# Patient Record
Sex: MALE | Race: OTHER | HISPANIC OR LATINO
[De-identification: names, ages, dates, MRNs, and addresses within clinical notes are randomized per-mention and may not be internally consistent; named-entity substitution may affect disease eponyms.]

---

## 2018-10-16 PROBLEM — Z00.00 ENCOUNTER FOR PREVENTIVE HEALTH EXAMINATION: Status: ACTIVE | Noted: 2018-10-16

## 2018-12-17 ENCOUNTER — APPOINTMENT (OUTPATIENT)
Dept: OTOLARYNGOLOGY | Facility: CLINIC | Age: 58
End: 2018-12-17
Payer: COMMERCIAL

## 2018-12-17 VITALS
BODY MASS INDEX: 31.92 KG/M2 | WEIGHT: 201 LBS | HEIGHT: 66.5 IN | HEART RATE: 109 BPM | DIASTOLIC BLOOD PRESSURE: 71 MMHG | SYSTOLIC BLOOD PRESSURE: 122 MMHG

## 2018-12-17 DIAGNOSIS — Z78.9 OTHER SPECIFIED HEALTH STATUS: ICD-10-CM

## 2018-12-17 DIAGNOSIS — H91.93 UNSPECIFIED HEARING LOSS, BILATERAL: ICD-10-CM

## 2018-12-17 DIAGNOSIS — Z86.79 PERSONAL HISTORY OF OTHER DISEASES OF THE CIRCULATORY SYSTEM: ICD-10-CM

## 2018-12-17 PROCEDURE — 92557 COMPREHENSIVE HEARING TEST: CPT

## 2018-12-17 PROCEDURE — 99243 OFF/OP CNSLTJ NEW/EST LOW 30: CPT | Mod: 25

## 2018-12-17 PROCEDURE — 92567 TYMPANOMETRY: CPT

## 2019-01-03 ENCOUNTER — OUTPATIENT (OUTPATIENT)
Dept: OUTPATIENT SERVICES | Facility: HOSPITAL | Age: 59
LOS: 1 days | Discharge: HOME | End: 2019-01-03

## 2019-01-03 ENCOUNTER — APPOINTMENT (OUTPATIENT)
Dept: SPEECH THERAPY | Facility: CLINIC | Age: 59
End: 2019-01-03

## 2019-01-07 DIAGNOSIS — H90.3 SENSORINEURAL HEARING LOSS, BILATERAL: ICD-10-CM

## 2019-01-23 ENCOUNTER — APPOINTMENT (OUTPATIENT)
Dept: PHARMACY | Facility: CLINIC | Age: 59
End: 2019-01-23
Payer: SELF-PAY

## 2019-01-23 PROCEDURE — V5010 ASSESSMENT FOR HEARING AID: CPT | Mod: NC

## 2019-01-27 ENCOUNTER — FORM ENCOUNTER (OUTPATIENT)
Age: 59
End: 2019-01-27

## 2019-01-28 ENCOUNTER — APPOINTMENT (OUTPATIENT)
Dept: MRI IMAGING | Facility: CLINIC | Age: 59
End: 2019-01-28
Payer: COMMERCIAL

## 2019-01-28 ENCOUNTER — APPOINTMENT (OUTPATIENT)
Dept: CT IMAGING | Facility: CLINIC | Age: 59
End: 2019-01-28
Payer: COMMERCIAL

## 2019-01-28 ENCOUNTER — OUTPATIENT (OUTPATIENT)
Dept: OUTPATIENT SERVICES | Facility: HOSPITAL | Age: 59
LOS: 1 days | End: 2019-01-28
Payer: COMMERCIAL

## 2019-01-28 DIAGNOSIS — Z00.8 ENCOUNTER FOR OTHER GENERAL EXAMINATION: ICD-10-CM

## 2019-01-28 PROCEDURE — 70480 CT ORBIT/EAR/FOSSA W/O DYE: CPT | Mod: 26

## 2019-01-28 PROCEDURE — 70480 CT ORBIT/EAR/FOSSA W/O DYE: CPT

## 2019-02-11 ENCOUNTER — APPOINTMENT (OUTPATIENT)
Dept: OTOLARYNGOLOGY | Facility: CLINIC | Age: 59
End: 2019-02-11

## 2019-04-09 ENCOUNTER — APPOINTMENT (OUTPATIENT)
Dept: PHARMACY | Facility: CLINIC | Age: 59
End: 2019-04-09
Payer: SELF-PAY

## 2019-04-09 PROCEDURE — V5010 ASSESSMENT FOR HEARING AID: CPT | Mod: NC

## 2019-05-06 ENCOUNTER — APPOINTMENT (OUTPATIENT)
Dept: PHARMACY | Facility: CLINIC | Age: 59
End: 2019-05-06

## 2019-06-06 ENCOUNTER — APPOINTMENT (OUTPATIENT)
Dept: SPEECH THERAPY | Facility: CLINIC | Age: 59
End: 2019-06-06

## 2019-06-06 ENCOUNTER — APPOINTMENT (OUTPATIENT)
Dept: PHARMACY | Facility: CLINIC | Age: 59
End: 2019-06-06
Payer: SELF-PAY

## 2019-06-06 PROCEDURE — V5299A: CUSTOM | Mod: NC

## 2019-06-24 ENCOUNTER — APPOINTMENT (OUTPATIENT)
Dept: OTOLARYNGOLOGY | Facility: CLINIC | Age: 59
End: 2019-06-24
Payer: COMMERCIAL

## 2019-06-24 VITALS
BODY MASS INDEX: 31.76 KG/M2 | HEIGHT: 66.5 IN | HEART RATE: 97 BPM | WEIGHT: 200 LBS | DIASTOLIC BLOOD PRESSURE: 86 MMHG | SYSTOLIC BLOOD PRESSURE: 127 MMHG

## 2019-06-24 DIAGNOSIS — R42 DIZZINESS AND GIDDINESS: ICD-10-CM

## 2019-06-24 PROCEDURE — 99214 OFFICE O/P EST MOD 30 MIN: CPT

## 2019-06-24 RX ORDER — ATORVASTATIN CALCIUM 80 MG/1
TABLET, FILM COATED ORAL
Refills: 0 | Status: ACTIVE | COMMUNITY

## 2019-06-24 RX ORDER — LISINOPRIL 10 MG/1
10 TABLET ORAL
Refills: 0 | Status: ACTIVE | COMMUNITY

## 2019-06-24 RX ORDER — MECLIZINE HYDROCHLORIDE 25 MG/1
TABLET ORAL
Refills: 0 | Status: ACTIVE | COMMUNITY

## 2019-06-24 NOTE — PHYSICAL EXAM
[Midline] : trachea located in midline position [Normal] : no rashes [de-identified] : no vision right eye, but no nystagmus

## 2019-06-24 NOTE — HISTORY OF PRESENT ILLNESS
[de-identified] : 58M here for f/u for bilateral hearing loss. Pt with b/l hearing aids- wears it consistently. Pt c/o of itching of the ears due to the warm weather. Pt feels his hearing is stable.

## 2019-07-02 ENCOUNTER — APPOINTMENT (OUTPATIENT)
Dept: PHARMACY | Facility: CLINIC | Age: 59
End: 2019-07-02
Payer: SELF-PAY

## 2019-07-02 PROCEDURE — V5299A: CUSTOM | Mod: NC

## 2019-07-15 ENCOUNTER — APPOINTMENT (OUTPATIENT)
Dept: SPEECH THERAPY | Facility: CLINIC | Age: 59
End: 2019-07-15

## 2019-08-21 ENCOUNTER — APPOINTMENT (OUTPATIENT)
Dept: OTOLARYNGOLOGY | Facility: CLINIC | Age: 59
End: 2019-08-21
Payer: COMMERCIAL

## 2019-08-21 PROCEDURE — 99214 OFFICE O/P EST MOD 30 MIN: CPT

## 2019-08-23 NOTE — HISTORY OF PRESENT ILLNESS
[de-identified] : 59M here for f/u for b/l hearing loss.  Pt has had CI eval. done- is interested in proceeding with the CI. Pt states his hearing is ok- not the best- the left ear is worse than the right. Pt wears the HAs consistently. \par \par Pt denies otalgia, otorrhea, ear infections, or headaches related to hearing. Pt notes he does get vertigo- takes medication and helps resolve it; last noted about 2 weeks ago- he states he experiences about 1x a month.

## 2019-08-23 NOTE — PHYSICAL EXAM
[Midline] : trachea located in midline position [Normal] : no rashes [de-identified] : no vision right eye, but no nystagmus

## 2019-08-23 NOTE — REASON FOR VISIT
[Subsequent Evaluation] : a subsequent evaluation for [Friend] : friend [FreeTextEntry2] : f/u for b/l hearing loss ;  used- Yaritza ID#: [id number]

## 2019-08-27 ENCOUNTER — TRANSCRIPTION ENCOUNTER (OUTPATIENT)
Age: 59
End: 2019-08-27

## 2019-08-28 ENCOUNTER — APPOINTMENT (OUTPATIENT)
Dept: NEUROLOGY | Facility: CLINIC | Age: 59
End: 2019-08-28
Payer: COMMERCIAL

## 2019-08-28 PROCEDURE — 99203 OFFICE O/P NEW LOW 30 MIN: CPT

## 2019-08-28 PROCEDURE — 95816 EEG AWAKE AND DROWSY: CPT

## 2019-08-28 NOTE — DISCUSSION/SUMMARY
[FreeTextEntry1] : 59-year-old man complaining of recurrent episodes of vertigo, associated with mild to moderate occipital headache, questionable cochlea migraine.\par Pending cochlear implants for bilateral hearing loss. Recent CT scan of the head demonstrated encephalomalacia, right frontal, temporal lobes, bony or mental fragments in the brain.\par Rule out seizures.\par Plan: EEG.\par meclizine as needed.\par Return to a filter above.
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
## 2019-08-28 NOTE — PHYSICAL EXAM
[General Appearance - Alert] : alert [General Appearance - In No Acute Distress] : in no acute distress [Oriented To Time, Place, And Person] : oriented to person, place, and time [Impaired Insight] : insight and judgment were intact [Person] : oriented to person [Place] : oriented to place [Writing A Sentence] : no difficulty writing a sentence [Fluency] : fluency intact [Comprehension] : comprehension intact [Reading] : reading intact [Vocabulary] : adequate range of vocabulary [Cranial Nerves Oculomotor (III)] : extraocular motion intact [Cranial Nerves Trigeminal (V)] : facial sensation intact symmetrically [Cranial Nerves Facial (VII)] : face symmetrical [Cranial Nerves Glossopharyngeal (IX)] : tongue and palate midline [Cranial Nerves Accessory (XI - Cranial And Spinal)] : head turning and shoulder shrug symmetric [Cranial Nerves Hypoglossal (XII)] : there was no tongue deviation with protrusion [Motor Tone] : muscle tone was normal in all four extremities [Motor Strength] : muscle strength was normal in all four extremities [Involuntary Movements] : no involuntary movements were seen [Motor Handedness Right-Handed] : the patient is right hand dominant [Paresis Pronator Drift Left-Sided] : no pronator drift on the left [Paresis Pronator Drift Right-Sided] : no pronator drift on the right [Sensation Tactile Decrease] : light touch was intact [Limited Balance] : balance was intact [Abnormal Walk] : normal gait [Tremor] : no tremor present [Past-pointing] : there was no past-pointing [Dysdiadochokinesia Bilaterally] : not present [Coordination - Dysmetria Impaired Finger-to-Nose Bilateral] : not present [1+] : Brachioradialis left 1+ [0] : Ankle jerk left 0 [FreeTextEntry5] : bilateral hearing aides, right corneal opacification [Neck Appearance] : the appearance of the neck was normal [Neck Cervical Mass (___cm)] : no neck mass was observed [] : no respiratory distress [Heart Rate And Rhythm] : heart rate was normal and rhythm regular [Exaggerated Use Of Accessory Muscles For Inspiration] : no accessory muscle use [Arterial Pulses Carotid] : carotid pulses were normal with no bruits [Edema] : there was no peripheral edema [Veins - Varicosity Changes] : there were no varicosital changes [No Spinal Tenderness] : no spinal tenderness
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
## 2019-08-28 NOTE — REVIEW OF SYSTEMS
[Confused or Disoriented] : no confusion [Decr. Concentrating Ability] : no decrease in concentrating ability [Repeating Questions] : no repeated questioning about recent events [Facial Weakness] : no facial weakness [Arm Weakness] : no arm weakness [Hand Weakness] : no hand weakness [Poor Coordination] : good coordination [Leg Weakness] : no leg weakness [Difficulty Writing] : no difficulty writing [Seizures] : no convulsions [Difficulties in Speech] : no speech difficulties [Dizziness] : dizziness [Vertigo] : vertigo [Frequent Falls] : not falling [Migraine Headache] : no migraine headache [Loss Of Hearing] : hearing loss [As Noted in HPI] : as noted in HPI [Negative] : Heme/Lymph

## 2019-10-14 ENCOUNTER — APPOINTMENT (OUTPATIENT)
Dept: PHARMACY | Facility: CLINIC | Age: 59
End: 2019-10-14
Payer: SELF-PAY

## 2019-10-14 PROCEDURE — V5266B: CUSTOM

## 2019-10-28 ENCOUNTER — APPOINTMENT (OUTPATIENT)
Dept: OTOLARYNGOLOGY | Facility: CLINIC | Age: 59
End: 2019-10-28
Payer: COMMERCIAL

## 2019-10-28 VITALS
HEART RATE: 106 BPM | SYSTOLIC BLOOD PRESSURE: 114 MMHG | HEIGHT: 66.5 IN | DIASTOLIC BLOOD PRESSURE: 84 MMHG | BODY MASS INDEX: 32.72 KG/M2 | WEIGHT: 206 LBS

## 2019-10-28 DIAGNOSIS — S09.90XS UNSPECIFIED INJURY OF HEAD, SEQUELA: ICD-10-CM

## 2019-10-28 PROCEDURE — 99213 OFFICE O/P EST LOW 20 MIN: CPT | Mod: 25

## 2019-10-28 PROCEDURE — 90670 PCV13 VACCINE IM: CPT

## 2019-10-28 PROCEDURE — 90471 IMMUNIZATION ADMIN: CPT

## 2019-10-28 NOTE — PHYSICAL EXAM
[Midline] : trachea located in midline position [Normal] : no rashes [de-identified] : no vision right eye, but no nystagmus

## 2019-10-28 NOTE — HISTORY OF PRESENT ILLNESS
[de-identified] : 59 year old male pre surgical visit scheduled for left cochlear implant 11/6/19.   Prevnar-13 vaccine (NDC 3451-6166-89/exp 9/20/ let # [id number]) given to patient into left deltoid by LEIGH ANN Blakely RN

## 2019-10-28 NOTE — REASON FOR VISIT
[Subsequent Evaluation] : a subsequent evaluation for [Friend] : friend [[employer] ] : provided by [employer]   [Source: ______] : History obtained from [unfilled] [FreeTextEntry1] : [id number] [FreeTextEntry2] : Neelam [TWNoteComboBox1] : [place]

## 2019-11-04 ENCOUNTER — OUTPATIENT (OUTPATIENT)
Dept: OUTPATIENT SERVICES | Facility: HOSPITAL | Age: 59
LOS: 1 days | End: 2019-11-04
Payer: COMMERCIAL

## 2019-11-04 VITALS
WEIGHT: 203.93 LBS | TEMPERATURE: 99 F | HEART RATE: 116 BPM | HEIGHT: 66.25 IN | OXYGEN SATURATION: 98 % | RESPIRATION RATE: 16 BRPM | DIASTOLIC BLOOD PRESSURE: 80 MMHG | SYSTOLIC BLOOD PRESSURE: 130 MMHG

## 2019-11-04 DIAGNOSIS — H90.5 UNSPECIFIED SENSORINEURAL HEARING LOSS: ICD-10-CM

## 2019-11-04 DIAGNOSIS — Z98.890 OTHER SPECIFIED POSTPROCEDURAL STATES: Chronic | ICD-10-CM

## 2019-11-04 LAB
ANION GAP SERPL CALC-SCNC: 16 MMO/L — HIGH (ref 7–14)
APTT BLD: 30.4 SEC — SIGNIFICANT CHANGE UP (ref 27.5–36.3)
BUN SERPL-MCNC: 20 MG/DL — SIGNIFICANT CHANGE UP (ref 7–23)
CALCIUM SERPL-MCNC: 9.9 MG/DL — SIGNIFICANT CHANGE UP (ref 8.4–10.5)
CHLORIDE SERPL-SCNC: 100 MMOL/L — SIGNIFICANT CHANGE UP (ref 98–107)
CO2 SERPL-SCNC: 23 MMOL/L — SIGNIFICANT CHANGE UP (ref 22–31)
CREAT SERPL-MCNC: 1.2 MG/DL — SIGNIFICANT CHANGE UP (ref 0.5–1.3)
GLUCOSE SERPL-MCNC: 100 MG/DL — HIGH (ref 70–99)
HCT VFR BLD CALC: 49.1 % — SIGNIFICANT CHANGE UP (ref 39–50)
HGB BLD-MCNC: 16.2 G/DL — SIGNIFICANT CHANGE UP (ref 13–17)
INR BLD: 1 — SIGNIFICANT CHANGE UP (ref 0.88–1.17)
MCHC RBC-ENTMCNC: 28.1 PG — SIGNIFICANT CHANGE UP (ref 27–34)
MCHC RBC-ENTMCNC: 33 % — SIGNIFICANT CHANGE UP (ref 32–36)
MCV RBC AUTO: 85.1 FL — SIGNIFICANT CHANGE UP (ref 80–100)
NRBC # FLD: 0 K/UL — SIGNIFICANT CHANGE UP (ref 0–0)
PLATELET # BLD AUTO: 304 K/UL — SIGNIFICANT CHANGE UP (ref 150–400)
PMV BLD: 9.4 FL — SIGNIFICANT CHANGE UP (ref 7–13)
POTASSIUM SERPL-MCNC: 4.3 MMOL/L — SIGNIFICANT CHANGE UP (ref 3.5–5.3)
POTASSIUM SERPL-SCNC: 4.3 MMOL/L — SIGNIFICANT CHANGE UP (ref 3.5–5.3)
PROTHROM AB SERPL-ACNC: 11.4 SEC — SIGNIFICANT CHANGE UP (ref 9.8–13.1)
RBC # BLD: 5.77 M/UL — SIGNIFICANT CHANGE UP (ref 4.2–5.8)
RBC # FLD: 13.9 % — SIGNIFICANT CHANGE UP (ref 10.3–14.5)
SODIUM SERPL-SCNC: 139 MMOL/L — SIGNIFICANT CHANGE UP (ref 135–145)
WBC # BLD: 12.49 K/UL — HIGH (ref 3.8–10.5)
WBC # FLD AUTO: 12.49 K/UL — HIGH (ref 3.8–10.5)

## 2019-11-04 PROCEDURE — 93010 ELECTROCARDIOGRAM REPORT: CPT

## 2019-11-04 NOTE — H&P PST ADULT - NSANTHOSAYNRD_GEN_A_CORE
No. BJ screening performed.  STOP BANG Legend: 0-2 = LOW Risk; 3-4 = INTERMEDIATE Risk; 5-8 = HIGH Risk

## 2019-11-04 NOTE — H&P PST ADULT - NEGATIVE MUSCULOSKELETAL SYMPTOMS
no leg pain R/no stiffness/no arthralgia/no muscle cramps/no arm pain L/no arm pain R/no joint swelling/no myalgia/no back pain/no leg pain L/no muscle weakness/no neck pain

## 2019-11-04 NOTE — H&P PST ADULT - NSICDXPROBLEM_GEN_ALL_CORE_FT
PROBLEM DIAGNOSES  Problem: Sensorineural hearing loss  Assessment and Plan: Pt is scheduled for left cochlear implant on 11/6/19.  Verbal and written pre op instructions reviewed with patient and pt able to verbalize understanding.    Pt met STOP BANG score for BJ precaution. OR booking notified via fax.   Pt instructed to take lisinopril AM of surgery with a sip of water, pt able to verbalize understanding.   Pt instructed to obtain medical eval for tachycardia, pt able to verbalize understanding.
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
## 2019-11-04 NOTE — H&P PST ADULT - HISTORY OF PRESENT ILLNESS
60 yo male with preop dx. unspecified sensorineural hearing loss presents to pre surgical testing.  Pt reports progressive hearing loss x 1 year, left worse than right.  Pt is scheduled for left cochlear implant on 11/6/19.

## 2019-11-04 NOTE — H&P PST ADULT - NEGATIVE CARDIOVASCULAR SYMPTOMS
no chest pain/no claudication/no orthopnea/no dyspnea on exertion/no paroxysmal nocturnal dyspnea/no palpitations/no peripheral edema

## 2019-11-04 NOTE — H&P PST ADULT - NEGATIVE NEUROLOGICAL SYMPTOMS
no transient paralysis/no weakness/no generalized seizures/no difficulty walking/no vertigo/no loss of sensation/no paresthesias

## 2019-11-06 ENCOUNTER — APPOINTMENT (OUTPATIENT)
Dept: OTOLARYNGOLOGY | Facility: HOSPITAL | Age: 59
End: 2019-11-06

## 2019-11-11 PROBLEM — I10 ESSENTIAL (PRIMARY) HYPERTENSION: Chronic | Status: ACTIVE | Noted: 2019-11-04

## 2019-11-11 PROBLEM — H90.5 UNSPECIFIED SENSORINEURAL HEARING LOSS: Chronic | Status: ACTIVE | Noted: 2019-11-04

## 2019-11-11 PROBLEM — E78.5 HYPERLIPIDEMIA, UNSPECIFIED: Chronic | Status: ACTIVE | Noted: 2019-11-04
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
## 2019-11-11 PROBLEM — R42 DIZZINESS AND GIDDINESS: Chronic | Status: ACTIVE | Noted: 2019-11-04

## 2019-11-12 ENCOUNTER — FORM ENCOUNTER (OUTPATIENT)
Age: 59
End: 2019-11-12

## 2019-11-13 ENCOUNTER — APPOINTMENT (OUTPATIENT)
Dept: OTOLARYNGOLOGY | Facility: HOSPITAL | Age: 59
End: 2019-11-13

## 2019-11-13 ENCOUNTER — OUTPATIENT (OUTPATIENT)
Dept: OUTPATIENT SERVICES | Facility: HOSPITAL | Age: 59
LOS: 1 days | Discharge: HOME | End: 2019-11-13
Payer: COMMERCIAL

## 2019-11-13 ENCOUNTER — APPOINTMENT (OUTPATIENT)
Dept: SPEECH THERAPY | Facility: CLINIC | Age: 59
End: 2019-11-13

## 2019-11-13 ENCOUNTER — RX RENEWAL (OUTPATIENT)
Age: 59
End: 2019-11-13

## 2019-11-13 ENCOUNTER — APPOINTMENT (OUTPATIENT)
Dept: SPEECH THERAPY | Facility: HOSPITAL | Age: 59
End: 2019-11-13

## 2019-11-13 ENCOUNTER — OUTPATIENT (OUTPATIENT)
Dept: OUTPATIENT SERVICES | Facility: HOSPITAL | Age: 59
LOS: 1 days | Discharge: HOME | End: 2019-11-13

## 2019-11-13 VITALS
RESPIRATION RATE: 16 BRPM | OXYGEN SATURATION: 97 % | TEMPERATURE: 98 F | HEIGHT: 66.25 IN | DIASTOLIC BLOOD PRESSURE: 87 MMHG | SYSTOLIC BLOOD PRESSURE: 137 MMHG | HEART RATE: 94 BPM | WEIGHT: 203.93 LBS

## 2019-11-13 VITALS
SYSTOLIC BLOOD PRESSURE: 130 MMHG | OXYGEN SATURATION: 100 % | RESPIRATION RATE: 15 BRPM | HEART RATE: 87 BPM | DIASTOLIC BLOOD PRESSURE: 77 MMHG | TEMPERATURE: 98 F

## 2019-11-13 DIAGNOSIS — Z98.890 OTHER SPECIFIED POSTPROCEDURAL STATES: Chronic | ICD-10-CM

## 2019-11-13 DIAGNOSIS — H90.5 UNSPECIFIED SENSORINEURAL HEARING LOSS: ICD-10-CM

## 2019-11-13 PROCEDURE — 92516 FACIAL NERVE FUNCTION TEST: CPT

## 2019-11-13 PROCEDURE — 70260 X-RAY EXAM OF SKULL: CPT | Mod: 26

## 2019-11-13 PROCEDURE — 69930 IMPLANT COCHLEAR DEVICE: CPT | Mod: LT

## 2019-11-13 RX ORDER — CEFDINIR 250 MG/5ML
1 POWDER, FOR SUSPENSION ORAL
Qty: 14 | Refills: 0
Start: 2019-11-13 | End: 2019-11-19

## 2019-11-13 RX ORDER — ACETAMINOPHEN WITH CODEINE 300MG-30MG
1 TABLET ORAL
Qty: 20 | Refills: 0
Start: 2019-11-13 | End: 2019-11-17

## 2019-11-13 NOTE — ASU DISCHARGE PLAN (ADULT/PEDIATRIC) - NURSING INSTRUCTIONS
You were given IV antibiotics in the OR. IF you are prescribed oral antibiotics to take at home. Please follow directions on the bottle and start your first dose of antibiotics before bedtime, and schedule your own timing until you finish all antibiotics. DO NOT DISCONTINUE ON YOUR OWN.     You were given IV Tylenol (1000mg) for pain management in the Operating Room.  Please do NOT take any additonal tylenol/acetaminophen products (Percocet, Vicodin, Excedrin) for the next 6-8 hours (after  515PM ). Please do not take tylenol AND percocet/vicodin/excedrin as narcotic prescription already contains tylenol.     When taking pain/narcotic medications - take with food as it can cause nausea if taken on an empty stomach, and know it may cause constipation. To prevent constipation increase fluids and fiber in diet. You may take stool softeners (such as Colace) and follow directions as printed on bottle. - Do NOT drive while on narcotics.     Refer to medication reconcillation sheet attached with discharge instruction paperwork.

## 2019-11-13 NOTE — ASU DISCHARGE PLAN (ADULT/PEDIATRIC) - PATIENT EDUCATION MATERIALS PROVIED
Provider pre-printed instructions given/Pre-printed instructions given for other (specify)/RN discharge instructions/Other (specify)

## 2019-11-13 NOTE — ASU DISCHARGE PLAN (ADULT/PEDIATRIC) - BATHING
keep left ear dry Do not submerge in water/MUST keep left ear dry. may shower after 48 hours/Shower only

## 2019-11-13 NOTE — ASU DISCHARGE PLAN (ADULT/PEDIATRIC) - CARE PROVIDER_API CALL
Rafael Foster)  Otolaryngology  [address] [place] [address]  Phone: [phone]  Fax: [phone]  Follow Up Time:

## 2019-11-13 NOTE — ASU DISCHARGE PLAN (ADULT/PEDIATRIC) - CALL YOUR DOCTOR IF YOU HAVE ANY OF THE FOLLOWING:
Fever greater than (need to indicate Fahrenheit or Celsius)/Pain not relieved by Medications/Bleeding that does not stop/Wound/Surgical Site with redness, or foul smelling discharge or pus

## 2019-11-15 RX ORDER — CEFDINIR 300 MG/1
300 CAPSULE ORAL
Qty: 20 | Refills: 0 | Status: DISCONTINUED | COMMUNITY
Start: 2019-11-15 | End: 2019-11-15

## 2019-11-19 DIAGNOSIS — H90.5 UNSPECIFIED SENSORINEURAL HEARING LOSS: ICD-10-CM

## 2019-11-20 ENCOUNTER — APPOINTMENT (OUTPATIENT)
Dept: OTOLARYNGOLOGY | Facility: CLINIC | Age: 59
End: 2019-11-20
Payer: COMMERCIAL

## 2019-11-20 PROCEDURE — 99024 POSTOP FOLLOW-UP VISIT: CPT

## 2019-12-02 ENCOUNTER — APPOINTMENT (OUTPATIENT)
Dept: SPEECH THERAPY | Facility: CLINIC | Age: 59
End: 2019-12-02

## 2019-12-05 ENCOUNTER — APPOINTMENT (OUTPATIENT)
Dept: SPEECH THERAPY | Facility: CLINIC | Age: 59
End: 2019-12-05

## 2019-12-15 NOTE — REASON FOR VISIT
[Subsequent Evaluation] : a subsequent evaluation for [Friend] : friend [FreeTextEntry1] : Ana Cristina [[facility]] : [id number] [TWNoteComboBox1] : [place]

## 2019-12-15 NOTE — HISTORY OF PRESENT ILLNESS
[de-identified] : 59M post-op visit for left CI done on 11/13. Pt states he has vertigo- lasting a few seconds at a time- had it today. Pt denies any pain, swelling, foul smelling drainage, fevers. Pt has been using right HA- wears it consistently. \par

## 2020-01-02 ENCOUNTER — APPOINTMENT (OUTPATIENT)
Dept: SPEECH THERAPY | Facility: CLINIC | Age: 60
End: 2020-01-02

## 2020-02-05 ENCOUNTER — APPOINTMENT (OUTPATIENT)
Dept: SPEECH THERAPY | Facility: CLINIC | Age: 60
End: 2020-02-05

## 2020-02-05 ENCOUNTER — APPOINTMENT (OUTPATIENT)
Dept: PHARMACY | Facility: CLINIC | Age: 60
End: 2020-02-05
Payer: SELF-PAY

## 2020-02-05 PROCEDURE — V5299A: CUSTOM | Mod: NC

## 2020-03-30 ENCOUNTER — APPOINTMENT (OUTPATIENT)
Dept: SPEECH THERAPY | Facility: CLINIC | Age: 60
End: 2020-03-30

## 2020-05-18 ENCOUNTER — APPOINTMENT (OUTPATIENT)
Dept: OTOLARYNGOLOGY | Facility: CLINIC | Age: 60
End: 2020-05-18

## 2020-09-19 ENCOUNTER — APPOINTMENT (OUTPATIENT)
Dept: OTOLARYNGOLOGY | Facility: CLINIC | Age: 60
End: 2020-09-19
Payer: COMMERCIAL

## 2020-09-19 VITALS
BODY MASS INDEX: 33.27 KG/M2 | DIASTOLIC BLOOD PRESSURE: 98 MMHG | WEIGHT: 207 LBS | SYSTOLIC BLOOD PRESSURE: 138 MMHG | HEART RATE: 111 BPM | TEMPERATURE: 98 F | HEIGHT: 66 IN

## 2020-09-19 PROCEDURE — 92567 TYMPANOMETRY: CPT

## 2020-09-19 PROCEDURE — 99213 OFFICE O/P EST LOW 20 MIN: CPT | Mod: 25

## 2020-09-19 PROCEDURE — 92557 COMPREHENSIVE HEARING TEST: CPT

## 2020-09-23 ENCOUNTER — OUTPATIENT (OUTPATIENT)
Dept: OUTPATIENT SERVICES | Facility: HOSPITAL | Age: 60
LOS: 1 days | Discharge: HOME | End: 2020-09-23

## 2020-09-23 ENCOUNTER — APPOINTMENT (OUTPATIENT)
Dept: SPEECH THERAPY | Facility: CLINIC | Age: 60
End: 2020-09-23

## 2020-09-23 DIAGNOSIS — Z98.890 OTHER SPECIFIED POSTPROCEDURAL STATES: Chronic | ICD-10-CM

## 2020-10-09 DIAGNOSIS — H90.5 UNSPECIFIED SENSORINEURAL HEARING LOSS: ICD-10-CM

## 2020-10-18 NOTE — REASON FOR VISIT
[Subsequent Evaluation] : a subsequent evaluation for [Hearing Loss] : hearing loss [[employer] ] : provided by [employer]   [Source: ______] : History obtained from [unfilled] [FreeTextEntry1] : [id number] [FreeTextEntry2] : Nadir [TWNoteComboBox1] : [place]

## 2020-10-18 NOTE — HISTORY OF PRESENT ILLNESS
[de-identified] : 60M f/u for hearing- with Left CI done on 11/13- using right HA- wears it consistently. \par No vertigo - feels hearing aid helping but implant not helping - no recent mapping -

## 2020-10-26 ENCOUNTER — APPOINTMENT (OUTPATIENT)
Dept: PHARMACY | Facility: CLINIC | Age: 60
End: 2020-10-26
Payer: SELF-PAY

## 2020-10-26 ENCOUNTER — APPOINTMENT (OUTPATIENT)
Dept: SPEECH THERAPY | Facility: CLINIC | Age: 60
End: 2020-10-26

## 2020-10-26 PROCEDURE — V5266B: CUSTOM

## 2020-11-16 ENCOUNTER — APPOINTMENT (OUTPATIENT)
Dept: SPEECH THERAPY | Facility: CLINIC | Age: 60
End: 2020-11-16

## 2020-12-09 ENCOUNTER — APPOINTMENT (OUTPATIENT)
Dept: SPEECH THERAPY | Facility: CLINIC | Age: 60
End: 2020-12-09

## 2020-12-11 ENCOUNTER — OUTPATIENT (OUTPATIENT)
Dept: OUTPATIENT SERVICES | Facility: HOSPITAL | Age: 60
LOS: 1 days | End: 2020-12-11
Payer: COMMERCIAL

## 2020-12-11 DIAGNOSIS — Z98.890 OTHER SPECIFIED POSTPROCEDURAL STATES: Chronic | ICD-10-CM

## 2020-12-11 DIAGNOSIS — Z11.59 ENCOUNTER FOR SCREENING FOR OTHER VIRAL DISEASES: ICD-10-CM

## 2020-12-11 PROCEDURE — U0003: CPT

## 2020-12-12 DIAGNOSIS — Z11.59 ENCOUNTER FOR SCREENING FOR OTHER VIRAL DISEASES: ICD-10-CM

## 2020-12-13 LAB — SARS-COV-2 RNA SPEC QL NAA+PROBE: SIGNIFICANT CHANGE UP

## 2021-01-19 ENCOUNTER — APPOINTMENT (OUTPATIENT)
Dept: SPEECH THERAPY | Facility: CLINIC | Age: 61
End: 2021-01-19

## 2021-02-01 ENCOUNTER — APPOINTMENT (OUTPATIENT)
Dept: PHARMACY | Facility: CLINIC | Age: 61
End: 2021-02-01

## 2021-02-23 ENCOUNTER — OUTPATIENT (OUTPATIENT)
Dept: OUTPATIENT SERVICES | Facility: HOSPITAL | Age: 61
LOS: 1 days | Discharge: HOME | End: 2021-02-23

## 2021-02-23 ENCOUNTER — APPOINTMENT (OUTPATIENT)
Dept: SPEECH THERAPY | Facility: CLINIC | Age: 61
End: 2021-02-23

## 2021-02-23 DIAGNOSIS — Z98.890 OTHER SPECIFIED POSTPROCEDURAL STATES: Chronic | ICD-10-CM

## 2021-03-05 DIAGNOSIS — H90.3 SENSORINEURAL HEARING LOSS, BILATERAL: ICD-10-CM

## 2021-03-31 ENCOUNTER — APPOINTMENT (OUTPATIENT)
Dept: SPEECH THERAPY | Facility: CLINIC | Age: 61
End: 2021-03-31

## 2021-03-31 ENCOUNTER — APPOINTMENT (OUTPATIENT)
Dept: PHARMACY | Facility: CLINIC | Age: 61
End: 2021-03-31
Payer: SELF-PAY

## 2021-03-31 PROCEDURE — V5299A: CUSTOM

## 2021-03-31 PROCEDURE — V5266A: CUSTOM

## 2021-04-09 DIAGNOSIS — H90.5 UNSPECIFIED SENSORINEURAL HEARING LOSS: ICD-10-CM

## 2021-04-14 ENCOUNTER — APPOINTMENT (OUTPATIENT)
Dept: PHARMACY | Facility: CLINIC | Age: 61
End: 2021-04-14
Payer: SELF-PAY

## 2021-04-14 PROCEDURE — V5299A: CUSTOM | Mod: NC,RT

## 2021-04-14 PROCEDURE — V5267D: CUSTOM

## 2021-05-04 ENCOUNTER — APPOINTMENT (OUTPATIENT)
Dept: SPEECH THERAPY | Facility: CLINIC | Age: 61
End: 2021-05-04

## 2021-05-04 ENCOUNTER — APPOINTMENT (OUTPATIENT)
Dept: PHARMACY | Facility: CLINIC | Age: 61
End: 2021-05-04
Payer: SELF-PAY

## 2021-05-04 PROCEDURE — V5299A: CUSTOM | Mod: NC,LT

## 2021-06-08 ENCOUNTER — APPOINTMENT (OUTPATIENT)
Dept: PHARMACY | Facility: CLINIC | Age: 61
End: 2021-06-08
Payer: SELF-PAY

## 2021-06-08 ENCOUNTER — APPOINTMENT (OUTPATIENT)
Dept: SPEECH THERAPY | Facility: CLINIC | Age: 61
End: 2021-06-08

## 2021-06-08 PROCEDURE — V5299A: CUSTOM | Mod: NC

## 2021-06-10 ENCOUNTER — APPOINTMENT (OUTPATIENT)
Dept: OTOLARYNGOLOGY | Facility: CLINIC | Age: 61
End: 2021-06-10
Payer: COMMERCIAL

## 2021-06-10 DIAGNOSIS — H90.5 UNSPECIFIED SENSORINEURAL HEARING LOSS: ICD-10-CM

## 2021-06-10 PROCEDURE — 99072 ADDL SUPL MATRL&STAF TM PHE: CPT

## 2021-06-10 PROCEDURE — 99213 OFFICE O/P EST LOW 20 MIN: CPT

## 2021-06-10 RX ORDER — CEFDINIR 300 MG/1
300 CAPSULE ORAL
Qty: 14 | Refills: 0 | Status: DISCONTINUED | COMMUNITY
Start: 2019-11-15 | End: 2021-06-10

## 2021-06-10 RX ORDER — ACETAMINOPHEN AND CODEINE PHOSPHATE 300; 15 MG/1; MG/1
300-15 TABLET ORAL
Qty: 28 | Refills: 0 | Status: DISCONTINUED | COMMUNITY
Start: 2019-11-13 | End: 2021-06-10

## 2021-06-10 NOTE — REASON FOR VISIT
[Subsequent Evaluation] : a subsequent evaluation for [Hearing Loss] : hearing loss [FreeTextEntry1] : [id number] [FreeTextEntry2] : Morenita Eckert  [TWNoteComboBox1] : Czech

## 2021-06-10 NOTE — HISTORY OF PRESENT ILLNESS
[de-identified] : 60M f/u for hearing- with Left CI done on 11/13/2019- using right HA consistently - hearing feels stable. Pt denies otalgia, otorrhea, ear infections. Feels not hearing well thru implant - still dependent on Hearing Aid -

## 2021-06-10 NOTE — PHYSICAL EXAM
[Midline] : trachea located in midline position [Normal] : no rashes [de-identified] : no vision right eye, but no nystagmus

## 2021-06-16 ENCOUNTER — APPOINTMENT (OUTPATIENT)
Dept: SPEECH THERAPY | Facility: CLINIC | Age: 61
End: 2021-06-16

## 2021-06-30 ENCOUNTER — APPOINTMENT (OUTPATIENT)
Dept: PHARMACY | Facility: CLINIC | Age: 61
End: 2021-06-30
Payer: SELF-PAY

## 2021-06-30 PROCEDURE — V5299A: CUSTOM | Mod: NC

## 2021-07-09 ENCOUNTER — APPOINTMENT (OUTPATIENT)
Dept: SPEECH THERAPY | Facility: CLINIC | Age: 61
End: 2021-07-09

## 2021-07-26 ENCOUNTER — APPOINTMENT (OUTPATIENT)
Dept: PHARMACY | Facility: CLINIC | Age: 61
End: 2021-07-26
Payer: SELF-PAY

## 2021-07-26 ENCOUNTER — APPOINTMENT (OUTPATIENT)
Dept: PHARMACY | Facility: CLINIC | Age: 61
End: 2021-07-26
Payer: COMMERCIAL

## 2021-07-26 PROCEDURE — V5266B: CUSTOM

## 2021-07-26 PROCEDURE — V5264F: CUSTOM | Mod: RT

## 2021-08-02 ENCOUNTER — APPOINTMENT (OUTPATIENT)
Dept: UROLOGY | Facility: CLINIC | Age: 61
End: 2021-08-02
Payer: COMMERCIAL

## 2021-08-02 VITALS
OXYGEN SATURATION: 96 % | HEART RATE: 100 BPM | SYSTOLIC BLOOD PRESSURE: 112 MMHG | DIASTOLIC BLOOD PRESSURE: 79 MMHG | HEIGHT: 66 IN | WEIGHT: 210 LBS | BODY MASS INDEX: 33.75 KG/M2

## 2021-08-02 PROCEDURE — 99203 OFFICE O/P NEW LOW 30 MIN: CPT

## 2021-08-02 RX ORDER — CIPROFLOXACIN HYDROCHLORIDE 500 MG/1
500 TABLET, FILM COATED ORAL
Qty: 20 | Refills: 0 | Status: ACTIVE | COMMUNITY
Start: 2021-08-02 | End: 1900-01-01

## 2021-08-02 NOTE — PHYSICAL EXAM
[General Appearance - Well Developed] : well developed [General Appearance - Well Nourished] : well nourished [Normal Appearance] : normal appearance [Well Groomed] : well groomed [General Appearance - In No Acute Distress] : no acute distress [Edema] : no peripheral edema [Respiration, Rhythm And Depth] : normal respiratory rhythm and effort [Exaggerated Use Of Accessory Muscles For Inspiration] : no accessory muscle use [Abdomen Soft] : soft [Abdomen Tenderness] : non-tender [Costovertebral Angle Tenderness] : no ~M costovertebral angle tenderness [Urethral Meatus] : meatus normal [Urinary Bladder Findings] : the bladder was normal on palpation [Scrotum] : the scrotum was normal [Testes Mass (___cm)] : there were no testicular masses [No Prostate Nodules] : no prostate nodules [FreeTextEntry1] : Prostate is moderate in size and palpably benign [Normal Station and Gait] : the gait and station were normal for the patient's age [] : no rash [No Focal Deficits] : no focal deficits [Oriented To Time, Place, And Person] : oriented to person, place, and time [Affect] : the affect was normal [Mood] : the mood was normal [Not Anxious] : not anxious [No Palpable Adenopathy] : no palpable adenopathy

## 2021-08-02 NOTE — HISTORY OF PRESENT ILLNESS
[FreeTextEntry1] : This patient is here with complaints of prostatism.  He states that he urinates frequently at night and occasionally has some dysuria.

## 2021-08-02 NOTE — REVIEW OF SYSTEMS
[Eyesight Problems] : eyesight problems [Loss of interest] : loss of interest in sexual activity [Urine Infection (bladder/kidney)] : bladder/kidney infection [Wake up at night to urinate  How many times?  ___] : wakes up to urinate [unfilled] times during the night [Strain or push to urinate] : strain or push to urinate [see HPI] : see HPI [Negative] : Cardiovascular [FreeTextEntry2] : High blood pressure

## 2021-08-02 NOTE — END OF VISIT
[FreeTextEntry3] : He will start tamsulosin and take a course of ciprofloxacin.  Labs are sent and he will follow-up with a transrectal ultrasound of the prostate gland

## 2021-08-04 LAB
APPEARANCE: CLEAR
BACTERIA UR CULT: NORMAL
BACTERIA: NEGATIVE
BILIRUBIN URINE: NEGATIVE
BLOOD URINE: NEGATIVE
COLOR: YELLOW
GLUCOSE QUALITATIVE U: NEGATIVE
HYALINE CASTS: 0 /LPF
KETONES URINE: NEGATIVE
LEUKOCYTE ESTERASE URINE: NEGATIVE
MICROSCOPIC-UA: NORMAL
NITRITE URINE: NEGATIVE
PH URINE: 6
PROTEIN URINE: ABNORMAL
PSA SERPL-MCNC: 1.29 NG/ML
RED BLOOD CELLS URINE: 1 /HPF
SPECIFIC GRAVITY URINE: 1.02
SQUAMOUS EPITHELIAL CELLS: 0 /HPF
URINE COMMENTS: NORMAL
UROBILINOGEN URINE: NORMAL
WHITE BLOOD CELLS URINE: 2 /HPF

## 2021-08-05 LAB — URINE CYTOLOGY: NORMAL

## 2021-08-10 ENCOUNTER — OUTPATIENT (OUTPATIENT)
Dept: OUTPATIENT SERVICES | Facility: HOSPITAL | Age: 61
LOS: 1 days | Discharge: HOME | End: 2021-08-10

## 2021-08-10 ENCOUNTER — APPOINTMENT (OUTPATIENT)
Dept: SPEECH THERAPY | Facility: CLINIC | Age: 61
End: 2021-08-10

## 2021-08-10 DIAGNOSIS — Z98.890 OTHER SPECIFIED POSTPROCEDURAL STATES: Chronic | ICD-10-CM

## 2021-08-26 ENCOUNTER — APPOINTMENT (OUTPATIENT)
Dept: PHARMACY | Facility: CLINIC | Age: 61
End: 2021-08-26
Payer: SELF-PAY

## 2021-08-26 PROCEDURE — V5014I: CUSTOM

## 2021-08-30 ENCOUNTER — NON-APPOINTMENT (OUTPATIENT)
Age: 61
End: 2021-08-30

## 2021-08-31 ENCOUNTER — APPOINTMENT (OUTPATIENT)
Dept: UROLOGY | Facility: CLINIC | Age: 61
End: 2021-08-31
Payer: COMMERCIAL

## 2021-08-31 DIAGNOSIS — N41.0 ACUTE PROSTATITIS: ICD-10-CM

## 2021-08-31 PROCEDURE — 76872 US TRANSRECTAL: CPT

## 2021-08-31 PROCEDURE — 76857 US EXAM PELVIC LIMITED: CPT

## 2021-09-13 ENCOUNTER — APPOINTMENT (OUTPATIENT)
Dept: SPEECH THERAPY | Facility: CLINIC | Age: 61
End: 2021-09-13

## 2021-10-05 ENCOUNTER — APPOINTMENT (OUTPATIENT)
Dept: PHARMACY | Facility: CLINIC | Age: 61
End: 2021-10-05
Payer: SELF-PAY

## 2021-10-05 PROCEDURE — V5267B: CUSTOM

## 2021-10-18 DIAGNOSIS — H90.5 UNSPECIFIED SENSORINEURAL HEARING LOSS: ICD-10-CM

## 2021-10-26 ENCOUNTER — NON-APPOINTMENT (OUTPATIENT)
Age: 61
End: 2021-10-26

## 2021-10-26 ENCOUNTER — APPOINTMENT (OUTPATIENT)
Dept: SPEECH THERAPY | Facility: CLINIC | Age: 61
End: 2021-10-26

## 2021-10-26 ENCOUNTER — OUTPATIENT (OUTPATIENT)
Dept: OUTPATIENT SERVICES | Facility: HOSPITAL | Age: 61
LOS: 1 days | Discharge: HOME | End: 2021-10-26

## 2021-10-26 DIAGNOSIS — Z98.890 OTHER SPECIFIED POSTPROCEDURAL STATES: Chronic | ICD-10-CM

## 2021-10-28 ENCOUNTER — NON-APPOINTMENT (OUTPATIENT)
Age: 61
End: 2021-10-28

## 2021-11-09 ENCOUNTER — APPOINTMENT (OUTPATIENT)
Dept: SPEECH THERAPY | Facility: CLINIC | Age: 61
End: 2021-11-09

## 2021-11-23 ENCOUNTER — APPOINTMENT (OUTPATIENT)
Dept: SPEECH THERAPY | Facility: CLINIC | Age: 61
End: 2021-11-23

## 2021-12-06 ENCOUNTER — APPOINTMENT (OUTPATIENT)
Dept: OTOLARYNGOLOGY | Facility: CLINIC | Age: 61
End: 2021-12-06

## 2021-12-07 ENCOUNTER — APPOINTMENT (OUTPATIENT)
Dept: SPEECH THERAPY | Facility: CLINIC | Age: 61
End: 2021-12-07

## 2021-12-08 ENCOUNTER — APPOINTMENT (OUTPATIENT)
Dept: PHARMACY | Facility: CLINIC | Age: 61
End: 2021-12-08

## 2021-12-08 ENCOUNTER — APPOINTMENT (OUTPATIENT)
Dept: SPEECH THERAPY | Facility: CLINIC | Age: 61
End: 2021-12-08

## 2021-12-13 ENCOUNTER — INPATIENT (INPATIENT)
Facility: HOSPITAL | Age: 61
LOS: 0 days | Discharge: HOME | DRG: 204 | End: 2021-12-14
Attending: INTERNAL MEDICINE | Admitting: HOSPITALIST
Payer: COMMERCIAL

## 2021-12-13 VITALS
OXYGEN SATURATION: 93 % | HEIGHT: 66.25 IN | HEART RATE: 99 BPM | WEIGHT: 207.01 LBS | TEMPERATURE: 99 F | SYSTOLIC BLOOD PRESSURE: 147 MMHG | DIASTOLIC BLOOD PRESSURE: 95 MMHG | RESPIRATION RATE: 15 BRPM

## 2021-12-13 DIAGNOSIS — Z98.890 OTHER SPECIFIED POSTPROCEDURAL STATES: Chronic | ICD-10-CM

## 2021-12-13 DIAGNOSIS — R07.9 CHEST PAIN, UNSPECIFIED: ICD-10-CM

## 2021-12-13 LAB
ADD ON TEST-SPECIMEN IN LAB: SIGNIFICANT CHANGE UP
ALBUMIN SERPL ELPH-MCNC: 3.5 G/DL — SIGNIFICANT CHANGE UP (ref 3.3–5)
ALP SERPL-CCNC: 103 U/L — SIGNIFICANT CHANGE UP (ref 40–120)
ALT FLD-CCNC: 43 U/L — SIGNIFICANT CHANGE UP (ref 12–78)
ANION GAP SERPL CALC-SCNC: 4 MMOL/L — LOW (ref 5–17)
AST SERPL-CCNC: 19 U/L — SIGNIFICANT CHANGE UP (ref 15–37)
BASOPHILS # BLD AUTO: 0.04 K/UL — SIGNIFICANT CHANGE UP (ref 0–0.2)
BASOPHILS NFR BLD AUTO: 0.4 % — SIGNIFICANT CHANGE UP (ref 0–2)
BILIRUB SERPL-MCNC: 0.7 MG/DL — SIGNIFICANT CHANGE UP (ref 0.2–1.2)
BUN SERPL-MCNC: 15 MG/DL — SIGNIFICANT CHANGE UP (ref 7–23)
CALCIUM SERPL-MCNC: 8.6 MG/DL — SIGNIFICANT CHANGE UP (ref 8.5–10.1)
CHLORIDE SERPL-SCNC: 107 MMOL/L — SIGNIFICANT CHANGE UP (ref 96–108)
CO2 SERPL-SCNC: 25 MMOL/L — SIGNIFICANT CHANGE UP (ref 22–31)
CREAT SERPL-MCNC: 1.22 MG/DL — SIGNIFICANT CHANGE UP (ref 0.5–1.3)
EOSINOPHIL # BLD AUTO: 0.13 K/UL — SIGNIFICANT CHANGE UP (ref 0–0.5)
EOSINOPHIL NFR BLD AUTO: 1.1 % — SIGNIFICANT CHANGE UP (ref 0–6)
GLUCOSE SERPL-MCNC: 115 MG/DL — HIGH (ref 70–99)
HCT VFR BLD CALC: 49.9 % — SIGNIFICANT CHANGE UP (ref 39–50)
HGB BLD-MCNC: 16.1 G/DL — SIGNIFICANT CHANGE UP (ref 13–17)
IMM GRANULOCYTES NFR BLD AUTO: 0.4 % — SIGNIFICANT CHANGE UP (ref 0–1.5)
LACTATE SERPL-SCNC: 0.9 MMOL/L — SIGNIFICANT CHANGE UP (ref 0.7–2)
LYMPHOCYTES # BLD AUTO: 18.5 % — SIGNIFICANT CHANGE UP (ref 13–44)
LYMPHOCYTES # BLD AUTO: 2.1 K/UL — SIGNIFICANT CHANGE UP (ref 1–3.3)
MCHC RBC-ENTMCNC: 28.4 PG — SIGNIFICANT CHANGE UP (ref 27–34)
MCHC RBC-ENTMCNC: 32.3 GM/DL — SIGNIFICANT CHANGE UP (ref 32–36)
MCV RBC AUTO: 88 FL — SIGNIFICANT CHANGE UP (ref 80–100)
MONOCYTES # BLD AUTO: 0.86 K/UL — SIGNIFICANT CHANGE UP (ref 0–0.9)
MONOCYTES NFR BLD AUTO: 7.6 % — SIGNIFICANT CHANGE UP (ref 2–14)
NEUTROPHILS # BLD AUTO: 8.21 K/UL — HIGH (ref 1.8–7.4)
NEUTROPHILS NFR BLD AUTO: 72 % — SIGNIFICANT CHANGE UP (ref 43–77)
PLATELET # BLD AUTO: 249 K/UL — SIGNIFICANT CHANGE UP (ref 150–400)
POTASSIUM SERPL-MCNC: 4 MMOL/L — SIGNIFICANT CHANGE UP (ref 3.5–5.3)
POTASSIUM SERPL-SCNC: 4 MMOL/L — SIGNIFICANT CHANGE UP (ref 3.5–5.3)
PROT SERPL-MCNC: 7.5 GM/DL — SIGNIFICANT CHANGE UP (ref 6–8.3)
RBC # BLD: 5.67 M/UL — SIGNIFICANT CHANGE UP (ref 4.2–5.8)
RBC # FLD: 13.3 % — SIGNIFICANT CHANGE UP (ref 10.3–14.5)
SARS-COV-2 RNA SPEC QL NAA+PROBE: SIGNIFICANT CHANGE UP
SODIUM SERPL-SCNC: 136 MMOL/L — SIGNIFICANT CHANGE UP (ref 135–145)
TROPONIN I, HIGH SENSITIVITY RESULT: 5.73 NG/L — SIGNIFICANT CHANGE UP
TROPONIN I, HIGH SENSITIVITY RESULT: 8.07 NG/L — SIGNIFICANT CHANGE UP
WBC # BLD: 11.38 K/UL — HIGH (ref 3.8–10.5)
WBC # FLD AUTO: 11.38 K/UL — HIGH (ref 3.8–10.5)

## 2021-12-13 PROCEDURE — 99285 EMERGENCY DEPT VISIT HI MDM: CPT

## 2021-12-13 PROCEDURE — 84484 ASSAY OF TROPONIN QUANT: CPT

## 2021-12-13 PROCEDURE — 71250 CT THORAX DX C-: CPT | Mod: 26,MA

## 2021-12-13 PROCEDURE — 36415 COLL VENOUS BLD VENIPUNCTURE: CPT

## 2021-12-13 PROCEDURE — 83605 ASSAY OF LACTIC ACID: CPT

## 2021-12-13 PROCEDURE — 80048 BASIC METABOLIC PNL TOTAL CA: CPT

## 2021-12-13 PROCEDURE — 85027 COMPLETE CBC AUTOMATED: CPT

## 2021-12-13 PROCEDURE — 72125 CT NECK SPINE W/O DYE: CPT | Mod: 26,MA

## 2021-12-13 PROCEDURE — 70450 CT HEAD/BRAIN W/O DYE: CPT | Mod: 26,MA

## 2021-12-13 PROCEDURE — 93306 TTE W/DOPPLER COMPLETE: CPT

## 2021-12-13 PROCEDURE — 86803 HEPATITIS C AB TEST: CPT

## 2021-12-13 PROCEDURE — 99223 1ST HOSP IP/OBS HIGH 75: CPT

## 2021-12-13 RX ORDER — ASPIRIN/CALCIUM CARB/MAGNESIUM 324 MG
81 TABLET ORAL DAILY
Refills: 0 | Status: DISCONTINUED | OUTPATIENT
Start: 2021-12-13 | End: 2021-12-14

## 2021-12-13 RX ORDER — ACETAMINOPHEN 500 MG
650 TABLET ORAL EVERY 6 HOURS
Refills: 0 | Status: DISCONTINUED | OUTPATIENT
Start: 2021-12-13 | End: 2021-12-14

## 2021-12-13 RX ORDER — ENOXAPARIN SODIUM 100 MG/ML
40 INJECTION SUBCUTANEOUS DAILY
Refills: 0 | Status: DISCONTINUED | OUTPATIENT
Start: 2021-12-13 | End: 2021-12-14

## 2021-12-13 RX ORDER — LANOLIN ALCOHOL/MO/W.PET/CERES
3 CREAM (GRAM) TOPICAL AT BEDTIME
Refills: 0 | Status: DISCONTINUED | OUTPATIENT
Start: 2021-12-13 | End: 2021-12-14

## 2021-12-13 RX ORDER — MECLIZINE HCL 12.5 MG
1 TABLET ORAL
Qty: 0 | Refills: 0 | DISCHARGE

## 2021-12-13 RX ORDER — ATORVASTATIN CALCIUM 80 MG/1
40 TABLET, FILM COATED ORAL AT BEDTIME
Refills: 0 | Status: DISCONTINUED | OUTPATIENT
Start: 2021-12-13 | End: 2021-12-14

## 2021-12-13 RX ORDER — ASPIRIN/CALCIUM CARB/MAGNESIUM 324 MG
1 TABLET ORAL
Qty: 0 | Refills: 0 | DISCHARGE

## 2021-12-13 RX ORDER — OXYCODONE AND ACETAMINOPHEN 5; 325 MG/1; MG/1
1 TABLET ORAL EVERY 4 HOURS
Refills: 0 | Status: DISCONTINUED | OUTPATIENT
Start: 2021-12-13 | End: 2021-12-14

## 2021-12-13 RX ORDER — LISINOPRIL 2.5 MG/1
1 TABLET ORAL
Qty: 0 | Refills: 0 | DISCHARGE

## 2021-12-13 RX ORDER — LISINOPRIL 2.5 MG/1
5 TABLET ORAL DAILY
Refills: 0 | Status: DISCONTINUED | OUTPATIENT
Start: 2021-12-13 | End: 2021-12-14

## 2021-12-13 RX ORDER — ONDANSETRON 8 MG/1
4 TABLET, FILM COATED ORAL EVERY 6 HOURS
Refills: 0 | Status: DISCONTINUED | OUTPATIENT
Start: 2021-12-13 | End: 2021-12-14

## 2021-12-13 RX ORDER — ATORVASTATIN CALCIUM 80 MG/1
1 TABLET, FILM COATED ORAL
Qty: 0 | Refills: 0 | DISCHARGE

## 2021-12-13 RX ADMIN — ATORVASTATIN CALCIUM 40 MILLIGRAM(S): 80 TABLET, FILM COATED ORAL at 22:03

## 2021-12-13 RX ADMIN — Medication 3 MILLIGRAM(S): at 22:03
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
## 2021-12-13 NOTE — ED ADULT NURSE NOTE - PAIN RATING/NUMBER SCALE (0-10): ACTIVITY
Preventive Health Recommendations   Ages 26 - 39  1. Total testosterone in 3 mos on a Friday.  2. Return to clinic in 1 for hormone check     Yearly exam:   See your health care provider every year in order to    Review health changes.     Discuss preventive care.      Review your medicines if you your doctor has prescribed any.    Until age 30: Get a Pap test every three years (more often if you have had an abnormal result).    After age 30: Talk to your doctor about whether you should have a Pap test every 3 years or have a Pap test with HPV screening every 5 years.   You do not need a Pap test if your uterus was removed (hysterectomy) and you have not had cancer.  You should be tested each year for STDs (sexually transmitted diseases), if you're at risk.   Talk to your provider about how often to have your cholesterol checked.  If you are at risk for diabetes, you should have a diabetes test (fasting glucose).  Shots: Get a flu shot each year. Get a tetanus shot every 10 years.   Nutrition:     Eat at least 5 servings of fruits and vegetables each day.    Eat whole-grain bread, whole-wheat pasta and brown rice instead of white grains and rice.    Talk to your provider about Calcium and Vitamin D.     Lifestyle    Exercise at least 150 minutes a week (30 minutes a day, 5 days of the week). This will help you control your weight and prevent disease.    Limit alcohol to one drink per day.    No smoking.     Wear sunscreen to prevent skin cancer.    See your dentist every six months for an exam and cleaning.    
0
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
## 2021-12-13 NOTE — H&P ADULT - NSHPLABSRESULTS_GEN_ALL_CORE
16.1   11.38 )-----------( 249      ( 13 Dec 2021 10:39 )             49.9     12-13    136  |  107  |  15  ----------------------------<  115<H>  4.0   |  25  |  1.22    Ca    8.6      13 Dec 2021 10:39    TPro  7.5  /  Alb  3.5  /  TBili  0.7  /  DBili  x   /  AST  19  /  ALT  43  /  AlkPhos  103  12-13    CAPILLARY BLOOD GLUCOSE  < from: CT Chest No Cont (12.13.21 @ 11:40) >      IMPRESSION:    Linear metallic densities within posterior left chest wall inferior to   the tip of the shoulder likely foreign bodies.    --- End of Report ---    < end of copied text >        POCT Blood Glucose.: 110 mg/dL (13 Dec 2021 10:32) 16.1   11.38 )-----------( 249      ( 13 Dec 2021 10:39 )             49.9     12-13    136  |  107  |  15  ----------------------------<  115<H>  4.0   |  25  |  1.22    Ca    8.6      13 Dec 2021 10:39    TPro  7.5  /  Alb  3.5  /  TBili  0.7  /  DBili  x   /  AST  19  /  ALT  43  /  AlkPhos  103  12-13    CAPILLARY BLOOD GLUCOSE  < from: CT Chest No Cont (12.13.21 @ 11:40) >  IMPRESSION:  Linear metallic densities within posterior left chest wall inferior to   the tip of the shoulder likely foreign bodies.    --- End of Report ---    < end of copied text >        POCT Blood Glucose.: 110 mg/dL (13 Dec 2021 10:32)
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
## 2021-12-13 NOTE — ED PROVIDER NOTE - ENMT, MLM
Airway patent, Nasal mucosa clear. Mouth with normal mucosa. Throat has no vesicles, no oropharyngeal exudates and uvula is midline. (+) decreased hearing in L ear at baseline

## 2021-12-13 NOTE — ED PROVIDER NOTE - CLINICAL SUMMARY MEDICAL DECISION MAKING FREE TEXT BOX
Plan: CT imaging, EKG, labs and consider admission for unexplained syncope.

## 2021-12-13 NOTE — ED ADULT NURSE NOTE - NSICDXPASTSURGICALHX_GEN_ALL_CORE_FT
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
## 2021-12-13 NOTE — ED ADULT NURSE NOTE - NS ED NOTE ABUSE SUSPICION NEGLECT YN
-- DO NOT REPLY / DO NOT REPLY ALL --  -- Message is from the [facility]--    COVID-19 Universal Screening: Negative      Patient is requesting a medication refill - medication is on active list    Was Medication Pended? Yes.    Rx Name and Dose:  sitaGLIPtin (JANUVIA) 100 MG tablet,atorvastatin (LIPITOR) 40 MG tablet, glipiZIDE (GLUCOTROL) 5 MG 24 hr tablet    Duration: 90 days    Pharmacy  [facility] - [address]    Patient confirmed the above pharmacy as correct?  Yes    Caller Information       Type Contact Phone    06/13/2020 09:58 AM Phone (Incoming) [facility] - [address] (Pharmacy) [phone]          Alternative phone number: none    Turnaround time given to caller:   \"Your doctor's office is closed. This message will be sent to our nurse. They will return your call as soon as they review your message. (Calls after 10 PM will be returned tomorrow morning.)\"  
No
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
## 2021-12-13 NOTE — ED ADULT NURSE NOTE - NSIMPLEMENTINTERV_GEN_ALL_ED
Implemented All Fall Risk Interventions:  [place] to call system. Call bell, personal items and telephone within reach. Instruct patient to call for assistance. Room bathroom lighting operational. Non-slip footwear when patient is off stretcher. Physically safe environment: no spills, clutter or unnecessary equipment. Stretcher in lowest position, wheels locked, appropriate side rails in place. Provide visual cue, wrist band, yellow gown, etc. Monitor gait and stability. Monitor for mental status changes and reorient to person, place, and time. Review medications for side effects contributing to fall risk. Reinforce activity limits and safety measures with patient and family.

## 2021-12-13 NOTE — PATIENT PROFILE ADULT - FALL HARM RISK - UNIVERSAL INTERVENTIONS
Bed in lowest position, wheels locked, appropriate side rails in place/Call bell, personal items and telephone in reach/Instruct patient to call for assistance before getting out of bed or chair/Non-slip footwear when patient is out of bed/[place] to call system/Physically safe environment - no spills, clutter or unnecessary equipment/Purposeful Proactive Rounding/Room/bathroom lighting operational, light cord in reach

## 2021-12-13 NOTE — ED PROVIDER NOTE - NSICDXPASTSURGICALHX_GEN_ALL_CORE_FT
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

## 2021-12-13 NOTE — H&P ADULT - NSHPREVIEWOFSYSTEMS_GEN_ALL_CORE
ROS:  General:  No fevers, chills, or unexplained weight loss  Skin: No rash or bothersome skin lesions  Musculoskeletal: No arthalgias, myalgias or joint swelling  Eyes: No visual changes or eye pain  Ears: No hearing loss , otorrhea or ear pain  Nose, Mouth, Throat: No nasal congestion, rhinorrhea, oral lesions, postnasal drip or sore throat  Cardio: chest pain  Respiratory: No cough, shortness of breath or wheezing   GI: No diarrhea, constipation, blood in stools, abdominal pain, vomiting or heartburn  : No urinary frequency, hematuria, incontinence, or dysuria  Neurologic: No headaches, parasthesias, confusion, dysarthria or gait instability  Psychiatric:  No anxiety or depression  Lymphatic:  No easy bruising, easy bleeding or swollen glands  Allergic: No itching, sneezing , watery eyes, clear rhinorrhea or recurrent infections
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
## 2021-12-14 ENCOUNTER — TRANSCRIPTION ENCOUNTER (OUTPATIENT)
Age: 61
End: 2021-12-14

## 2021-12-14 VITALS
DIASTOLIC BLOOD PRESSURE: 79 MMHG | HEART RATE: 83 BPM | TEMPERATURE: 97 F | RESPIRATION RATE: 18 BRPM | OXYGEN SATURATION: 98 % | SYSTOLIC BLOOD PRESSURE: 122 MMHG

## 2021-12-14 DIAGNOSIS — E78.5 HYPERLIPIDEMIA, UNSPECIFIED: ICD-10-CM

## 2021-12-14 DIAGNOSIS — R42 DIZZINESS AND GIDDINESS: ICD-10-CM

## 2021-12-14 DIAGNOSIS — R55 SYNCOPE AND COLLAPSE: ICD-10-CM

## 2021-12-14 DIAGNOSIS — I10 ESSENTIAL (PRIMARY) HYPERTENSION: ICD-10-CM

## 2021-12-14 LAB
ANION GAP SERPL CALC-SCNC: 4 MMOL/L — LOW (ref 5–17)
BUN SERPL-MCNC: 23 MG/DL — SIGNIFICANT CHANGE UP (ref 7–23)
CALCIUM SERPL-MCNC: 9.2 MG/DL — SIGNIFICANT CHANGE UP (ref 8.5–10.1)
CHLORIDE SERPL-SCNC: 107 MMOL/L — SIGNIFICANT CHANGE UP (ref 96–108)
CO2 SERPL-SCNC: 26 MMOL/L — SIGNIFICANT CHANGE UP (ref 22–31)
CREAT SERPL-MCNC: 1.15 MG/DL — SIGNIFICANT CHANGE UP (ref 0.5–1.3)
GLUCOSE SERPL-MCNC: 103 MG/DL — HIGH (ref 70–99)
HCT VFR BLD CALC: 49.8 % — SIGNIFICANT CHANGE UP (ref 39–50)
HCV AB S/CO SERPL IA: 0.16 S/CO — SIGNIFICANT CHANGE UP (ref 0–0.99)
HCV AB SERPL-IMP: SIGNIFICANT CHANGE UP
HGB BLD-MCNC: 16.3 G/DL — SIGNIFICANT CHANGE UP (ref 13–17)
MCHC RBC-ENTMCNC: 28.6 PG — SIGNIFICANT CHANGE UP (ref 27–34)
MCHC RBC-ENTMCNC: 32.7 GM/DL — SIGNIFICANT CHANGE UP (ref 32–36)
MCV RBC AUTO: 87.5 FL — SIGNIFICANT CHANGE UP (ref 80–100)
PLATELET # BLD AUTO: 236 K/UL — SIGNIFICANT CHANGE UP (ref 150–400)
POTASSIUM SERPL-MCNC: 4.2 MMOL/L — SIGNIFICANT CHANGE UP (ref 3.5–5.3)
POTASSIUM SERPL-SCNC: 4.2 MMOL/L — SIGNIFICANT CHANGE UP (ref 3.5–5.3)
RBC # BLD: 5.69 M/UL — SIGNIFICANT CHANGE UP (ref 4.2–5.8)
RBC # FLD: 13.4 % — SIGNIFICANT CHANGE UP (ref 10.3–14.5)
SODIUM SERPL-SCNC: 137 MMOL/L — SIGNIFICANT CHANGE UP (ref 135–145)
WBC # BLD: 10.94 K/UL — HIGH (ref 3.8–10.5)
WBC # FLD AUTO: 10.94 K/UL — HIGH (ref 3.8–10.5)

## 2021-12-14 PROCEDURE — 93306 TTE W/DOPPLER COMPLETE: CPT | Mod: 26

## 2021-12-14 PROCEDURE — 99223 1ST HOSP IP/OBS HIGH 75: CPT

## 2021-12-14 PROCEDURE — 99239 HOSP IP/OBS DSCHRG MGMT >30: CPT

## 2021-12-14 RX ORDER — CX-024414 0.2 MG/ML
0.25 INJECTION, SUSPENSION INTRAMUSCULAR ONCE
Refills: 0 | Status: COMPLETED | OUTPATIENT
Start: 2021-12-14 | End: 2021-12-14
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
## 2021-12-14 NOTE — DISCHARGE NOTE PROVIDER - NSDCMRMEDTOKEN_GEN_ALL_CORE_FT
aspirin 81 mg oral tablet: 1 tab(s) orally once a day  atorvastatin 40 mg oral tablet: 1 tab(s) orally once a day  lisinopril 5 mg oral tablet: 1 tab(s) orally once a day  meclizine 25 mg oral tablet: 1 tab(s) orally once a day, As Needed
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
## 2021-12-14 NOTE — DISCHARGE NOTE PROVIDER - CARE PROVIDER_API CALL
CARLOS YAKOV  [facility]  [address] [place] [address]  Phone: [phone]  Fax: [phone]  Follow Up Time: 1 week    Palla, Venugopal R (MD)  Cardiovascular Disease; Internal Medicine  [address] [place], [place] [id number]  Phone: [phone]  Fax: [phone]  Follow Up Time: 1-3 days

## 2021-12-14 NOTE — DISCHARGE NOTE NURSING/CASE MANAGEMENT/SOCIAL WORK - PATIENT PORTAL LINK FT
You can access the FollowMyHealth Patient Portal offered by [facility] by registering at the following website: [email or web address]. By joining [facility]’s FollowMyHealth portal, you will also be able to view your health information using other applications (apps) compatible with our system.
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
## 2021-12-14 NOTE — DISCHARGE NOTE PROVIDER - CARE PROVIDERS DIRECT ADDRESSES
[email or web address]
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
## 2021-12-14 NOTE — DISCHARGE NOTE NURSING/CASE MANAGEMENT/SOCIAL WORK - NSDCPEFALRISK_GEN_ALL_CORE
For information on Fall & Injury Prevention, visit: [email or web address] OR  [email or web address] OR  https://www.cdc.gov/steadi/patient.html

## 2021-12-14 NOTE — DISCHARGE NOTE NURSING/CASE MANAGEMENT/SOCIAL WORK - BRAND OF COVID-19 VACCINATION
Pt agreed for the Moderna booster then declined the vaccine Dr Urbina & Vaibhav from the pharmacy were notified. Dose is to be returned back to pharmacy./Moderna dose 1 and 2

## 2021-12-20 DIAGNOSIS — R55 SYNCOPE AND COLLAPSE: ICD-10-CM
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
## 2021-12-20 DIAGNOSIS — Z79.82 LONG TERM (CURRENT) USE OF ASPIRIN: ICD-10-CM

## 2021-12-20 DIAGNOSIS — E78.5 HYPERLIPIDEMIA, UNSPECIFIED: ICD-10-CM

## 2021-12-20 DIAGNOSIS — I10 ESSENTIAL (PRIMARY) HYPERTENSION: ICD-10-CM

## 2021-12-20 DIAGNOSIS — H90.5 UNSPECIFIED SENSORINEURAL HEARING LOSS: ICD-10-CM
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
## 2021-12-20 DIAGNOSIS — R07.9 CHEST PAIN, UNSPECIFIED: ICD-10-CM

## 2021-12-21 ENCOUNTER — APPOINTMENT (OUTPATIENT)
Dept: PHARMACY | Facility: CLINIC | Age: 61
End: 2021-12-21
Payer: SELF-PAY

## 2021-12-21 PROCEDURE — V5299A: CUSTOM | Mod: NC

## 2021-12-22 ENCOUNTER — NON-APPOINTMENT (OUTPATIENT)
Age: 61
End: 2021-12-22

## 2021-12-28 ENCOUNTER — APPOINTMENT (OUTPATIENT)
Dept: SPEECH THERAPY | Facility: CLINIC | Age: 61
End: 2021-12-28

## 2021-12-30 DIAGNOSIS — H90.5 UNSPECIFIED SENSORINEURAL HEARING LOSS: ICD-10-CM

## 2022-01-13 DIAGNOSIS — H90.3 SENSORINEURAL HEARING LOSS, BILATERAL: ICD-10-CM

## 2022-01-24 ENCOUNTER — APPOINTMENT (OUTPATIENT)
Dept: PHARMACY | Facility: CLINIC | Age: 62
End: 2022-01-24
Payer: SELF-PAY

## 2022-01-24 PROCEDURE — V5093: CPT

## 2022-02-01 ENCOUNTER — APPOINTMENT (OUTPATIENT)
Dept: PHARMACY | Facility: CLINIC | Age: 62
End: 2022-02-01
Payer: SELF-PAY

## 2022-02-01 PROCEDURE — V5299A: CUSTOM | Mod: NC

## 2022-02-17 ENCOUNTER — APPOINTMENT (OUTPATIENT)
Dept: PHARMACY | Facility: CLINIC | Age: 62
End: 2022-02-17
Payer: SELF-PAY

## 2022-02-17 PROCEDURE — V5299A: CUSTOM | Mod: NC

## 2022-03-28 ENCOUNTER — APPOINTMENT (OUTPATIENT)
Dept: PHARMACY | Facility: CLINIC | Age: 62
End: 2022-03-28
Payer: SELF-PAY

## 2022-03-28 PROCEDURE — V5299A: CUSTOM | Mod: NC

## 2022-04-07 ENCOUNTER — APPOINTMENT (OUTPATIENT)
Dept: SPEECH THERAPY | Facility: CLINIC | Age: 62
End: 2022-04-07

## 2022-04-27 DIAGNOSIS — H90.3 SENSORINEURAL HEARING LOSS, BILATERAL: ICD-10-CM

## 2022-06-03 ENCOUNTER — APPOINTMENT (OUTPATIENT)
Dept: PHARMACY | Facility: CLINIC | Age: 62
End: 2022-06-03

## 2022-06-03 ENCOUNTER — APPOINTMENT (OUTPATIENT)
Dept: SPEECH THERAPY | Facility: CLINIC | Age: 62
End: 2022-06-03

## 2022-06-23 ENCOUNTER — APPOINTMENT (OUTPATIENT)
Dept: PHARMACY | Facility: CLINIC | Age: 62
End: 2022-06-23
Payer: SELF-PAY

## 2022-06-23 PROCEDURE — V5299A: CUSTOM | Mod: NC

## 2022-06-29 ENCOUNTER — NON-APPOINTMENT (OUTPATIENT)
Age: 62
End: 2022-06-29

## 2022-06-30 ENCOUNTER — NON-APPOINTMENT (OUTPATIENT)
Age: 62
End: 2022-06-30

## 2022-07-01 ENCOUNTER — NON-APPOINTMENT (OUTPATIENT)
Age: 62
End: 2022-07-01

## 2022-07-07 ENCOUNTER — APPOINTMENT (OUTPATIENT)
Dept: PHARMACY | Facility: CLINIC | Age: 62
End: 2022-07-07

## 2022-07-07 PROCEDURE — V5299A: CUSTOM | Mod: NC

## 2022-07-12 ENCOUNTER — APPOINTMENT (OUTPATIENT)
Dept: SPEECH THERAPY | Facility: CLINIC | Age: 62
End: 2022-07-12

## 2022-07-12 ENCOUNTER — OUTPATIENT (OUTPATIENT)
Dept: OUTPATIENT SERVICES | Facility: HOSPITAL | Age: 62
LOS: 1 days | Discharge: HOME | End: 2022-07-12

## 2022-07-12 DIAGNOSIS — Z98.890 OTHER SPECIFIED POSTPROCEDURAL STATES: Chronic | ICD-10-CM

## 2022-07-15 DIAGNOSIS — H90.3 SENSORINEURAL HEARING LOSS, BILATERAL: ICD-10-CM

## 2022-07-29 ENCOUNTER — NON-APPOINTMENT (OUTPATIENT)
Age: 62
End: 2022-07-29

## 2022-09-14 ENCOUNTER — APPOINTMENT (OUTPATIENT)
Dept: PHARMACY | Facility: CLINIC | Age: 62
End: 2022-09-14

## 2022-09-14 PROCEDURE — V5267D: CUSTOM | Mod: RT

## 2022-09-21 ENCOUNTER — APPOINTMENT (OUTPATIENT)
Dept: PHARMACY | Facility: CLINIC | Age: 62
End: 2022-09-21

## 2022-09-23 ENCOUNTER — APPOINTMENT (OUTPATIENT)
Dept: PHARMACY | Facility: CLINIC | Age: 62
End: 2022-09-23

## 2022-09-23 PROCEDURE — V5299A: CUSTOM | Mod: NC

## 2022-09-26 ENCOUNTER — NON-APPOINTMENT (OUTPATIENT)
Age: 62
End: 2022-09-26

## 2022-09-27 ENCOUNTER — NON-APPOINTMENT (OUTPATIENT)
Age: 62
End: 2022-09-27

## 2022-10-10 ENCOUNTER — APPOINTMENT (OUTPATIENT)
Dept: OTOLARYNGOLOGY | Facility: CLINIC | Age: 62
End: 2022-10-10

## 2022-10-25 ENCOUNTER — APPOINTMENT (OUTPATIENT)
Dept: SPEECH THERAPY | Facility: CLINIC | Age: 62
End: 2022-10-25

## 2022-11-09 ENCOUNTER — NON-APPOINTMENT (OUTPATIENT)
Age: 62
End: 2022-11-09

## 2022-11-10 ENCOUNTER — NON-APPOINTMENT (OUTPATIENT)
Age: 62
End: 2022-11-10

## 2022-12-08 ENCOUNTER — EMERGENCY (EMERGENCY)
Facility: HOSPITAL | Age: 62
LOS: 0 days | Discharge: HOME | End: 2022-12-08
Attending: EMERGENCY MEDICINE
Payer: COMMERCIAL

## 2022-12-08 VITALS — WEIGHT: 201.94 LBS | HEIGHT: 64 IN

## 2022-12-08 VITALS
TEMPERATURE: 99 F | RESPIRATION RATE: 16 BRPM | OXYGEN SATURATION: 99 % | SYSTOLIC BLOOD PRESSURE: 129 MMHG | HEART RATE: 94 BPM | DIASTOLIC BLOOD PRESSURE: 90 MMHG
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
## 2022-12-08 DIAGNOSIS — M79.662 PAIN IN LEFT LOWER LEG: ICD-10-CM

## 2022-12-08 DIAGNOSIS — Z97.4 PRESENCE OF EXTERNAL HEARING-AID: ICD-10-CM

## 2022-12-08 DIAGNOSIS — Z87.828 PERSONAL HISTORY OF OTHER (HEALED) PHYSICAL INJURY AND TRAUMA: ICD-10-CM

## 2022-12-08 DIAGNOSIS — E78.5 HYPERLIPIDEMIA, UNSPECIFIED: ICD-10-CM

## 2022-12-08 DIAGNOSIS — I10 ESSENTIAL (PRIMARY) HYPERTENSION: ICD-10-CM

## 2022-12-08 DIAGNOSIS — H91.91 UNSPECIFIED HEARING LOSS, RIGHT EAR: ICD-10-CM

## 2022-12-08 DIAGNOSIS — Z98.890 OTHER SPECIFIED POSTPROCEDURAL STATES: Chronic | ICD-10-CM

## 2022-12-08 DIAGNOSIS — S80.852A SUPERFICIAL FOREIGN BODY, LEFT LOWER LEG, INITIAL ENCOUNTER: ICD-10-CM

## 2022-12-08 DIAGNOSIS — H54.7 UNSPECIFIED VISUAL LOSS: ICD-10-CM

## 2022-12-08 DIAGNOSIS — Z97.0 PRESENCE OF ARTIFICIAL EYE: ICD-10-CM
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
## 2022-12-08 DIAGNOSIS — Z79.82 LONG TERM (CURRENT) USE OF ASPIRIN: ICD-10-CM

## 2022-12-08 PROCEDURE — 99284 EMERGENCY DEPT VISIT MOD MDM: CPT

## 2022-12-08 PROCEDURE — 73590 X-RAY EXAM OF LOWER LEG: CPT | Mod: LT

## 2022-12-08 PROCEDURE — 93971 EXTREMITY STUDY: CPT | Mod: LT

## 2022-12-08 PROCEDURE — 93971 EXTREMITY STUDY: CPT | Mod: 26,LT

## 2022-12-08 PROCEDURE — 73590 X-RAY EXAM OF LOWER LEG: CPT | Mod: 26,LT

## 2022-12-08 PROCEDURE — 99284 EMERGENCY DEPT VISIT MOD MDM: CPT | Mod: 25

## 2022-12-08 NOTE — ED STATDOCS - CARE PROVIDER_API CALL
Neo Gonzalez (DO)  Orthopaedic Surgery  [address] [place] [address]  Phone: [phone]  Fax: [phone]  Follow Up Time:

## 2022-12-08 NOTE — ED STATDOCS - MUSCULOSKELETAL, MLM
range of motion is not limited and there is no muscle tenderness. Mild TTP left calf. Distal NVI. No palpable cord. No edema

## 2022-12-08 NOTE — ED STATDOCS - NSFOLLOWUPINSTRUCTIONS_ED_ALL_ED_FT
Dolor muscular en los adultos    Muscle Pain, Adult      El dolor muscular, también llamado mialgia, es dorie afección que causa dolor en renita o más músculos del cuerpo. El dolor muscular puede ser leve, moderado o intenso. Puede ser moe, dorie molestia continua o dorie sensación de ardor. La mayoría de las veces, el dolor dura solo un corto período y desaparece sin tratamiento.    El dolor muscular puede ser el resultado de usar los músculos de forma nueva o diferente, o después de un período de inactividad. Es normal sentir algo de dolor muscular después de comenzar un programa de ejercicios. Generalmente duelen aquellos músculos que no se utilizan con frecuencia.      ¿Cuáles son las causas?    La causa de esta afección es el uso de los músculos de un modo nuevo o diferente después de un período de inactividad. Algunas otras causas son las siguientes:  •Uso excesivo del músculo o distensión muscular, en especial si la persona no está en buen estado físico. Esta es la causa más común del dolor muscular.      •Lesiones o moretones.      •Enfermedades infecciosas, incluso las enfermedades causadas por virus, davi la gripe (influenza).      •Fibromialgia.Es dorie afección crónica, de larga duración, que causa sensibilidad muscular, cansancio (fatiga) y dolor de yohana.      •Enfermedades autoinmunes o reumatológicas. Son afecciones, davi el lupus, que se caracterizan porque el sistema de defensa del cuerpo (sistemainmunitario) ataca zonas del cuerpo.      •Determinados medicamentos, davi los inhibidores de la enzima convertidora de angiotensina (IECA) y las estatinas.        ¿Cuáles son los signos o síntomas?    El síntoma principal de esta afección es la inflamación o el dolor muscular, incluso al hacer actividad física y al elongar. También puede sadie dorie leve hinchazón.      ¿Cómo se diagnostica?    Esta afección se diagnostica mediante un examen físico. El médico le hará preguntas acerca del dolor y cuándo comenzó a sentirlo. Si el dolor muscular no comenzó hace mucho tiempo, el médico puede esperar antes de hacer diversas pruebas. Si el dolor muscular ha durado mucho tiempo, se pueden realizar pruebas de inmediato. En algunos casos, se pueden realizar pruebas para descartar ciertas afecciones o enfermedades.      ¿Cómo se trata?    El tratamiento de esta afección depende de la causa. El cuidado en el hogar a menudo es suficiente para aliviar el dolor muscular. El médico también puede recetarle antiinflamatorios no esteroideos (MAX), davi ibuprofeno.      Siga estas instrucciones en rueda casa:    Medicamentos     •Use los medicamentos de venta clotilde y los recetados solamente davi se lo haya indicado el médico.      •Pregúntele al médico si el medicamento recetado le impide conducir o usar maquinaria.        Manejo del dolor, la hinchazón y las molestias       Bag of ice on a towel on the skin.       A heating pad for use on the affected area.   •Si se lo indican, aplique hielo sobre la vicenta dolorida. Para hacer esto:  •Ponga el hielo en dorie bolsa plástica.      •Coloque dorie toalla entre la piel y la bolsa.      •Aplique el hielo chin 20 minutos, 2 o 3 veces por día.      •Chin los primeros 2 días de dolor muscular, o si hay hinchazón:  •No se dé windy calientes.      •No use el jacuzzi, baño aleksandr, sauna, almohadillas térmicas ni ninguna otra umair de calor.      •Después de que transcurran entre 48 y 72 horas, puede alternar entre aplicar hielo y aplicar calor davi se lo haya indicado el médico. Si se lo indican, aplique calor en la vicenta afectada con la frecuencia que le haya indicado el médico. Use la umair de calor que el médico le recomiende, davi dorie compresa de calor húmedo o dorie almohadilla térmica.  •Coloque dorie toalla entre la piel y la umair de calor.      •Aplique calor chin 20 a 30 minutos.      •Retire la umair de calor si la piel se pone de color harris brillante. [place] es especialmente importante si no puede sentir dolor, calor o frío. Puede correr un riesgo mayor de sufrir quemaduras.        •Si tiene dorie lesión, levante (eleve) la vicenta lesionada por encima del nivel del corazón mientras esté sentado o acostado.        Actividad   A person standing with arms stretched straight out in front and then squatting while keeping the knees over the toes. •Si el uso excesivo del músculo está provocando dolor muscular:  •Disminuya lola actividades hasta que el dolor desaparezca.      •Si usted no hace actividad física con frecuencia, los ejercicios deben ser suaves y regulares.      •Precaliente antes de la actividad física. Elongue antes y después de hacer ejercicios. [place] puede ayudar a disminuir el riesgo de que sufra dolor muscular.        • No siga haciendo actividad física si el dolor es muy intenso. El dolor intenso podría indicar que se ha lesionado un músculo.      • No levante ningún objeto que pese más de 5 a 10 libras (2.3 a 4.5 kg) o que supere el límite de peso que le hayan indicado, hasta que el médico le diga que puede hacerlo.      •Retome lola actividades normales según lo indicado por el médico. Pregúntele al médico qué actividades son seguras para usted.      Instrucciones generales     • No consuma ningún producto que contenga nicotina o tabaco, davi cigarrillos, cigarrillos electrónicos y tabaco de mascar. Estos pueden retrasar la recuperación. Si necesita ayuda para dejar de fumar, consulte al médico.      •Concurra a todas las visitas de seguimiento davi se lo haya indicado el médico. [place] es importante.        Comuníquese con un médico si tiene:    •Dolor muscular que empeora y los medicamentos no son eficaces.      •Dolor muscular que dura más de 3 días.      •Dorie erupción cutánea o fiebre junto con el dolor muscular.      •Dolor muscular después de dorie picadura de garrapata.      •Dolor muscular mientras hace actividad física, aunque esté en buen estado físico.      •Enrojecimiento, sensibilidad o hinchazón junto con el dolor muscular.      •Dolor muscular después de comenzar un medicamento nuevo o de cambiar la dosis de un medicamento.        Solicite ayuda inmediatamente si tiene:    •Dificultad para respirar.      •Dificultad para tragar.      •Dolor muscular junto con rigidez en el samara, fiebre y vómitos.      •Debilidad muscular intensa o imposibilidad de  dorie parte del cuerpo.      Estos síntomas pueden representar un problema grave que constituye dorie emergencia. No espere a chandni si los síntomas desaparecen. Solicite atención médica de inmediato. Comuníquese con el servicio de emergencias de rueda localidad (911 en los [place]). No conduzca por lola propios medios hasta el hospital.       Resumen    •Generalmente, el dolor muscular dura solo un corto período y desaparece sin tratamiento.      •La causa de esta afección es el uso de los músculos de un modo nuevo o diferente después de un período de inactividad.      •Si el dolor muscular dura más de 3 días, infórmeselo al médico.

## 2022-12-08 NOTE — ED STATDOCS - PATIENT PORTAL LINK FT
You can access the FollowMyHealth Patient Portal offered by [facility] by registering at the following website: [email or web address]. By joining [facility]’s FollowMyHealth portal, you will also be able to view your health information using other applications (apps) compatible with our system.

## 2022-12-08 NOTE — ED ADULT TRIAGE NOTE - CHIEF COMPLAINT QUOTE
Pt presents to ER c/l left calf pain. Onset of symptoms began 4 days PTA. Denies injury/fall/swelling. Skin intact
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
## 2022-12-08 NOTE — ED STATDOCS - NS ED ATTENDING STATEMENT MOD
This was a shared visit with the YUMIKO. I reviewed and verified the documentation and independently performed the documented:

## 2022-12-08 NOTE — ED STATDOCS - ENMT, MLM
Nasal mucosa clear.  Mouth with normal mucosa  Throat has no vesicles, no oropharyngeal exudates and uvula is midline. Nasal mucosa clear.  Mouth with normal mucosa  Throat has no vesicles, no oropharyngeal exudates and uvula is midline. Hearing impaired.
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
## 2022-12-08 NOTE — ED STATDOCS - EYES, MLM
clear bilaterally.  Pupils equal, round, and reactive to light. clear bilaterally.  Blind right eye.

## 2022-12-15 ENCOUNTER — APPOINTMENT (OUTPATIENT)
Dept: ORTHOPEDIC SURGERY | Facility: CLINIC | Age: 62
End: 2022-12-15

## 2022-12-15 ENCOUNTER — NON-APPOINTMENT (OUTPATIENT)
Age: 62
End: 2022-12-15

## 2022-12-15 VITALS
BODY MASS INDEX: 33.75 KG/M2 | HEIGHT: 66 IN | SYSTOLIC BLOOD PRESSURE: 151 MMHG | DIASTOLIC BLOOD PRESSURE: 101 MMHG | HEART RATE: 99 BPM | WEIGHT: 210 LBS

## 2022-12-15 PROCEDURE — 99204 OFFICE O/P NEW MOD 45 MIN: CPT

## 2022-12-22 ENCOUNTER — APPOINTMENT (OUTPATIENT)
Dept: CT IMAGING | Facility: CLINIC | Age: 62
End: 2022-12-22

## 2022-12-22 ENCOUNTER — RESULT REVIEW (OUTPATIENT)
Age: 62
End: 2022-12-22

## 2022-12-22 ENCOUNTER — OUTPATIENT (OUTPATIENT)
Dept: OUTPATIENT SERVICES | Facility: HOSPITAL | Age: 62
LOS: 1 days | End: 2022-12-22
Payer: COMMERCIAL

## 2022-12-22 DIAGNOSIS — S80.859A SUPERFICIAL FOREIGN BODY, UNSPECIFIED LOWER LEG, INITIAL ENCOUNTER: ICD-10-CM

## 2022-12-22 DIAGNOSIS — Z98.890 OTHER SPECIFIED POSTPROCEDURAL STATES: Chronic | ICD-10-CM

## 2022-12-22 PROCEDURE — 73700 CT LOWER EXTREMITY W/O DYE: CPT | Mod: 26,LT

## 2022-12-22 PROCEDURE — 76376 3D RENDER W/INTRP POSTPROCES: CPT | Mod: 26

## 2022-12-22 PROCEDURE — 76376 3D RENDER W/INTRP POSTPROCES: CPT

## 2022-12-22 PROCEDURE — 73700 CT LOWER EXTREMITY W/O DYE: CPT
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
## 2022-12-29 ENCOUNTER — APPOINTMENT (OUTPATIENT)
Dept: ORTHOPEDIC SURGERY | Facility: CLINIC | Age: 62
End: 2022-12-29

## 2022-12-29 PROCEDURE — 99214 OFFICE O/P EST MOD 30 MIN: CPT

## 2022-12-29 NOTE — PHYSICAL EXAM
[de-identified] : Left lower leg\par Inspection: no effusion, no erythema.\par Wounds: Small well-healed scars\par Palpation: Mild tenderness on palpation the mid calf\par Mild discomfort with resisted dorsiflexion and plantarflexion of foot [de-identified] : CT left tib-fib reviewed and interpreted–small metal fragment in the mid calf
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
## 2023-01-12 ENCOUNTER — APPOINTMENT (OUTPATIENT)
Dept: SPEECH THERAPY | Facility: CLINIC | Age: 63
End: 2023-01-12

## 2023-01-12 ENCOUNTER — APPOINTMENT (OUTPATIENT)
Dept: PHARMACY | Facility: CLINIC | Age: 63
End: 2023-01-12
Payer: SELF-PAY

## 2023-01-12 ENCOUNTER — NON-APPOINTMENT (OUTPATIENT)
Age: 63
End: 2023-01-12

## 2023-01-12 PROCEDURE — V5014F: CUSTOM

## 2023-01-12 NOTE — HISTORY OF PRESENT ILLNESS
[FreeTextEntry1] : 62 year old male seen for [facility] appt. for Resound BTE hearing aid Ad and uses cochlear implant for As. \par Resound HA SN: [id number], also has spare aid that is OOW.\par UW 12/28/2022\par  [FreeTextEntry8] : Patient seen with complaint that backup hearing aid is draining battery quickly.
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
## 2023-01-12 NOTE — HISTORY OF PRESENT ILLNESS
[FreeTextEntry1] : Left ear internal 622 cochlear implant with external black N7 processor with 6cm coil/cable and 3I magnet [FreeTextEntry8] : Patient here for hearing aid appointment but also asked for CI mapping since missed last appointment. Still with complaints of "noise" from the map.

## 2023-01-12 NOTE — REASON FOR VISIT
[Follow-Up] : follow-up for [Cochlear Implant] : cochlear implant [Time Spent: ____ minutes] : Total time spent using  services: [unfilled] minutes. The patient's primary language is not English thus required  services. [Interpreters_IDNumber] : [id number] [Interpreters_FullName] : Tracee [FreeTextEntry3] : Video  [TWNoteComboBox1] : Emirati

## 2023-01-12 NOTE — REASON FOR VISIT
[Follow-Up] : follow-up for [Hearing Aid] : Hearing Aid [Other: ______] : provided by [employer] [Time Spent: ____ minutes] : Total time spent using  services: [unfilled] minutes. The patient's primary language is not English thus required  services. [Interpreters_IDNumber] : [id number] [Interpreters_FullName] : Dwaine  [TWNoteComboBox1] : Kazakh

## 2023-01-17 ENCOUNTER — NON-APPOINTMENT (OUTPATIENT)
Age: 63
End: 2023-01-17

## 2023-01-17 ENCOUNTER — APPOINTMENT (OUTPATIENT)
Dept: ORTHOPEDIC SURGERY | Facility: CLINIC | Age: 63
End: 2023-01-17
Payer: COMMERCIAL

## 2023-01-17 DIAGNOSIS — S80.859A SUPERFICIAL FOREIGN BODY, UNSPECIFIED LOWER LEG, INITIAL ENCOUNTER: ICD-10-CM

## 2023-01-17 PROCEDURE — 99214 OFFICE O/P EST MOD 30 MIN: CPT

## 2023-01-17 RX ORDER — MELOXICAM 15 MG/1
15 TABLET ORAL DAILY
Qty: 30 | Refills: 1 | Status: ACTIVE | COMMUNITY
Start: 2022-12-15 | End: 1900-01-01

## 2023-01-17 NOTE — PHYSICAL EXAM
[de-identified] : Left lower leg\par Inspection: no effusion, no erythema.\par Wounds: Small well-healed scars\par Palpation: Mild tenderness on palpation the mid calf\par Mild discomfort with resisted dorsiflexion and plantarflexion of foot [de-identified] : Prior imaging -CT left tib-fib reviewed and interpreted–small metal fragment in the mid calf
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
## 2023-02-02 ENCOUNTER — APPOINTMENT (OUTPATIENT)
Dept: PHARMACY | Facility: CLINIC | Age: 63
End: 2023-02-02
Payer: SELF-PAY

## 2023-02-02 PROCEDURE — V5299A: CUSTOM | Mod: NC

## 2023-02-03 ENCOUNTER — NON-APPOINTMENT (OUTPATIENT)
Age: 63
End: 2023-02-03

## 2023-02-06 ENCOUNTER — NON-APPOINTMENT (OUTPATIENT)
Age: 63
End: 2023-02-06

## 2023-02-06 ENCOUNTER — APPOINTMENT (OUTPATIENT)
Dept: GASTROENTEROLOGY | Facility: CLINIC | Age: 63
End: 2023-02-06

## 2023-02-14 ENCOUNTER — APPOINTMENT (OUTPATIENT)
Dept: PHARMACY | Facility: CLINIC | Age: 63
End: 2023-02-14
Payer: SELF-PAY

## 2023-02-14 PROCEDURE — V5299A: CUSTOM | Mod: NC,RT

## 2023-02-21 ENCOUNTER — NON-APPOINTMENT (OUTPATIENT)
Age: 63
End: 2023-02-21

## 2023-02-22 ENCOUNTER — APPOINTMENT (OUTPATIENT)
Dept: PHARMACY | Facility: CLINIC | Age: 63
End: 2023-02-22
Payer: SELF-PAY

## 2023-02-22 ENCOUNTER — NON-APPOINTMENT (OUTPATIENT)
Age: 63
End: 2023-02-22

## 2023-02-22 PROCEDURE — V5299A: CUSTOM | Mod: NC

## 2023-03-08 ENCOUNTER — NON-APPOINTMENT (OUTPATIENT)
Age: 63
End: 2023-03-08

## 2023-03-09 ENCOUNTER — APPOINTMENT (OUTPATIENT)
Dept: GASTROENTEROLOGY | Facility: CLINIC | Age: 63
End: 2023-03-09

## 2023-03-20 ENCOUNTER — APPOINTMENT (OUTPATIENT)
Dept: PHARMACY | Facility: CLINIC | Age: 63
End: 2023-03-20
Payer: SELF-PAY

## 2023-03-20 PROCEDURE — V5014F: CUSTOM | Mod: RT

## 2023-04-05 ENCOUNTER — APPOINTMENT (OUTPATIENT)
Dept: PHARMACY | Facility: CLINIC | Age: 63
End: 2023-04-05
Payer: SELF-PAY

## 2023-04-05 PROCEDURE — V5299A: CUSTOM | Mod: NC

## 2023-04-05 PROCEDURE — V5266B: CUSTOM

## 2023-04-06 ENCOUNTER — APPOINTMENT (OUTPATIENT)
Dept: GASTROENTEROLOGY | Facility: CLINIC | Age: 63
End: 2023-04-06

## 2023-04-18 ENCOUNTER — APPOINTMENT (OUTPATIENT)
Dept: SPEECH THERAPY | Facility: CLINIC | Age: 63
End: 2023-04-18

## 2023-04-26 ENCOUNTER — APPOINTMENT (OUTPATIENT)
Dept: SPEECH THERAPY | Facility: CLINIC | Age: 63
End: 2023-04-26

## 2023-04-26 ENCOUNTER — APPOINTMENT (OUTPATIENT)
Dept: PHARMACY | Facility: CLINIC | Age: 63
End: 2023-04-26
Payer: SELF-PAY

## 2023-04-26 ENCOUNTER — NON-APPOINTMENT (OUTPATIENT)
Age: 63
End: 2023-04-26

## 2023-04-26 ENCOUNTER — OUTPATIENT (OUTPATIENT)
Dept: OUTPATIENT SERVICES | Facility: HOSPITAL | Age: 63
LOS: 1 days | Discharge: HOME | End: 2023-04-26

## 2023-04-26 DIAGNOSIS — Z98.890 OTHER SPECIFIED POSTPROCEDURAL STATES: Chronic | ICD-10-CM

## 2023-04-26 PROCEDURE — V5299A: CUSTOM | Mod: NC

## 2023-04-26 NOTE — HISTORY OF PRESENT ILLNESS
[FreeTextEntry1] : Left ear internal 622 cochlear implant with black N7 processor with 3I magnet and 6cm coil cable.  [FreeTextEntry8] : patient still not successful with mapping. Will try a Population Mean map today. Patient reports current rechargeable batteries are not holding a charge for the full day

## 2023-04-26 NOTE — ASSESSMENT
[FreeTextEntry1] : Left ear impedances continue to reveal electrode 3 to be open\par \par Tried new Population Mean map with new 900Hz 8 max PW 37 \par \par Live voice mapping for new Population map with new DR of 46 with new volume 5\par P2 Same as P1\par \par Called Cochlear reimbursement to assist patient with ordering new rechargeable batteries through insurance.  Cochlear needed his new address and new [place] insurance information.\par Cochlear reimbursement cannot place battery order until patient signs over "Assignment of Benefits". Patient did not want this form mailed to him.  Cochlear will mail the Assignment of Benefits form to him in Dutch with a return envelope. Patient informed he needs to sign this form and return it ASAP in order for Cochlear to place battery order. 
Satisfactory

## 2023-04-26 NOTE — REASON FOR VISIT
[Follow-Up] : follow-up for [Cochlear Implant] : cochlear implant [[employer] ] : provided by [employer]   [Time Spent: ____ minutes] : Total time spent using  services: [unfilled] minutes. The patient's primary language is not English thus required  services. [Interpreters_IDNumber] : [id number] [Interpreters_FullName] : Binu [FreeTextEntry3] : Visual [TWNoteComboBox1] : Turkmen

## 2023-04-27 ENCOUNTER — NON-APPOINTMENT (OUTPATIENT)
Age: 63
End: 2023-04-27

## 2023-04-27 DIAGNOSIS — H90.3 SENSORINEURAL HEARING LOSS, BILATERAL: ICD-10-CM

## 2023-06-21 ENCOUNTER — APPOINTMENT (OUTPATIENT)
Dept: SPEECH THERAPY | Facility: CLINIC | Age: 63
End: 2023-06-21

## 2023-06-21 NOTE — ASSESSMENT
[FreeTextEntry1] : Left ear impedances revealed electrode 3 to be open and turned off\par \par Data logging is 12 hours.\par \par Current map 46 is a 900Hz 8 max PW 37 map with DR 46\par \par P1 New 10 max, incr T levels 2 CU and decreased C levels by 2 CU for a new DR of 42. Volume=5.\par Patient reports he is comfortable and this map is a little clearer\par P2 Same as P1\par \par \par Patient brought in his back up hearing aid to have Kandy look at also today.\par

## 2023-06-21 NOTE — HISTORY OF PRESENT ILLNESS
[FreeTextEntry1] : Left ear internal 622 cochlear implant with external black N7 processor with 3I magnet. [FreeTextEntry8] : Patient currently using new Population Mean Map 45. Reports this map is much more comfortable and feels combined with the hearing aid in his right ear he is hearing better. \par Patient reports he received the new batteries that we ordered at his last visit and had some questions about charging.

## 2023-06-21 NOTE — REASON FOR VISIT
[Follow-Up] : follow-up for [Cochlear Implant] : cochlear implant [[employer] ] : provided by [employer]   [Time Spent: ____ minutes] : Total time spent using  services: [unfilled] minutes. The patient's primary language is not English thus required  services. [Interpreters_IDNumber] : [id number] [Interpreters_FullName] : Jenny [FreeTextEntry3] : Video [TWNoteComboBox1] : Lithuanian

## 2023-06-22 ENCOUNTER — NON-APPOINTMENT (OUTPATIENT)
Age: 63
End: 2023-06-22

## 2023-07-05 ENCOUNTER — APPOINTMENT (OUTPATIENT)
Dept: PHARMACY | Facility: CLINIC | Age: 63
End: 2023-07-05
Payer: SELF-PAY

## 2023-07-05 PROCEDURE — V5299A: CUSTOM

## 2023-07-06 ENCOUNTER — APPOINTMENT (OUTPATIENT)
Dept: NEUROLOGY | Facility: CLINIC | Age: 63
End: 2023-07-06

## 2023-07-14 ENCOUNTER — APPOINTMENT (OUTPATIENT)
Dept: PHARMACY | Facility: CLINIC | Age: 63
End: 2023-07-14
Payer: SELF-PAY

## 2023-07-14 PROCEDURE — V5299A: CUSTOM

## 2023-07-24 ENCOUNTER — APPOINTMENT (OUTPATIENT)
Dept: UROLOGY | Facility: CLINIC | Age: 63
End: 2023-07-24
Payer: MEDICAID

## 2023-07-24 VITALS
WEIGHT: 198 LBS | RESPIRATION RATE: 16 BRPM | BODY MASS INDEX: 31.82 KG/M2 | HEART RATE: 99 BPM | SYSTOLIC BLOOD PRESSURE: 130 MMHG | OXYGEN SATURATION: 94 % | HEIGHT: 66 IN | DIASTOLIC BLOOD PRESSURE: 91 MMHG

## 2023-07-24 PROCEDURE — 99214 OFFICE O/P EST MOD 30 MIN: CPT

## 2023-07-24 NOTE — HISTORY OF PRESENT ILLNESS
[FreeTextEntry1] : 63M presents today with a CC of nocturia and slowing of the stream. Pt also notes some frequency. He has had this for some period of time.

## 2023-07-24 NOTE — END OF VISIT
[FreeTextEntry3] : Pt will receive a prescription for Tamsulosin 1 tablet qhs. A PSA and urine are sent. He will followup with a TRUSP in 3 weeks.

## 2023-07-24 NOTE — PHYSICAL EXAM
[General Appearance - Well Developed] : well developed [Normal Appearance] : normal appearance [General Appearance - Well Nourished] : well nourished [Well Groomed] : well groomed [General Appearance - In No Acute Distress] : no acute distress [Edema] : no peripheral edema [Respiration, Rhythm And Depth] : normal respiratory rhythm and effort [Exaggerated Use Of Accessory Muscles For Inspiration] : no accessory muscle use [Abdomen Tenderness] : non-tender [Abdomen Soft] : soft [Costovertebral Angle Tenderness] : no ~M costovertebral angle tenderness [Urethral Meatus] : meatus normal [Urinary Bladder Findings] : the bladder was normal on palpation [Scrotum] : the scrotum was normal [Testes Mass (___cm)] : there were no testicular masses [No Prostate Nodules] : no prostate nodules [FreeTextEntry1] : Normal male genitalia. Penis is uncircumcised. The prostate gland is small to moderate size and palpably benign.  [Normal Station and Gait] : the gait and station were normal for the patient's age [No Focal Deficits] : no focal deficits [] : no rash [Oriented To Time, Place, And Person] : oriented to person, place, and time [Affect] : the affect was normal [Mood] : the mood was normal [Not Anxious] : not anxious [No Palpable Adenopathy] : no palpable adenopathy

## 2023-07-25 LAB
APPEARANCE: CLEAR
BACTERIA: NEGATIVE /HPF
BILIRUBIN URINE: NEGATIVE
BLOOD URINE: NEGATIVE
CALCIUM OXALATE CRYSTALS: PRESENT
CAST: 0 /LPF
COLOR: YELLOW
EPITHELIAL CELLS: 0 /HPF
GLUCOSE QUALITATIVE U: NEGATIVE MG/DL
KETONES URINE: NEGATIVE MG/DL
LEUKOCYTE ESTERASE URINE: NEGATIVE
MICROSCOPIC-UA: NORMAL
NITRITE URINE: NEGATIVE
PH URINE: 5.5
PROTEIN URINE: NEGATIVE MG/DL
PSA SERPL-MCNC: 1.84 NG/ML
RED BLOOD CELLS URINE: 2 /HPF
REVIEW: NORMAL
SPECIFIC GRAVITY URINE: 1.02
UROBILINOGEN URINE: 0.2 MG/DL
WHITE BLOOD CELLS URINE: 1 /HPF

## 2023-07-27 LAB — URINE CYTOLOGY: NORMAL

## 2023-08-02 RX ORDER — TAMSULOSIN HYDROCHLORIDE 0.4 MG/1
0.4 CAPSULE ORAL
Qty: 90 | Refills: 3 | Status: ACTIVE | COMMUNITY
Start: 2021-08-02 | End: 1900-01-01

## 2023-08-03 NOTE — H&P PST ADULT - GASTROINTESTINAL
----- Message from GHAZAL Farnsworth sent at 8/2/2023 12:21 PM CDT -----  Test results show improved and normal kidney function, sodium is 145 which is slightly elevated but otherwise glucose is normal; A1c does show prediabetes at 5.8 which is early-continue recommendations as per yesterday with PCP   details… detailed exam

## 2023-09-18 ENCOUNTER — APPOINTMENT (OUTPATIENT)
Dept: UROLOGY | Facility: CLINIC | Age: 63
End: 2023-09-18

## 2023-09-20 ENCOUNTER — APPOINTMENT (OUTPATIENT)
Dept: SPEECH THERAPY | Facility: CLINIC | Age: 63
End: 2023-09-20

## 2023-11-02 NOTE — ED ADULT NURSE NOTE - FINAL NURSING ELECTRONIC SIGNATURE
Detail Level: Detailed Depth Of Biopsy: dermis Was A Bandage Applied: Yes Size Of Lesion In Cm: 0 Biopsy Type: H and E Biopsy Method: Dermablade Anesthesia Type: 1% lidocaine without epinephrine Anesthesia Volume In Cc (Will Not Render If 0): 0.5 Hemostasis: Drysol Wound Care: Petrolatum Dressing: bandage Destruction After The Procedure: No Type Of Destruction Used: Curettage Curettage Text: The wound bed was treated with curettage after the biopsy was performed. Cryotherapy Text: The wound bed was treated with cryotherapy after the biopsy was performed. Electrodesiccation Text: The wound bed was treated with electrodesiccation after the biopsy was performed. Electrodesiccation And Curettage Text: The wound bed was treated with electrodesiccation and curettage after the biopsy was performed. Silver Nitrate Text: The wound bed was treated with silver nitrate after the biopsy was performed. Lab: [id number] Lab Facility: [id number] Consent: Written consent was obtained and risks were reviewed including but not limited to scarring, infection, bleeding, scabbing, incomplete removal, nerve damage and allergy to anesthesia. Post-Care Instructions: I reviewed with the patient in detail post-care instructions. Patient is to keep the biopsy site dry overnight, and then apply vaseline twice daily until healed. Notification Instructions: Patient will be notified of biopsy results. However, patient instructed to call the office if not contacted within 2 weeks. Billing Type: Third-Party Bill Information: Selecting Yes will display possible errors in your note based on the variables you have selected. This validation is only offered as a suggestion for you. PLEASE NOTE THAT THE VALIDATION TEXT WILL BE REMOVED WHEN YOU FINALIZE YOUR NOTE. IF YOU WANT TO FAX A PRELIMINARY NOTE YOU WILL NEED TO TOGGLE THIS TO 'NO' IF YOU DO NOT WANT IT IN YOUR FAXED NOTE. 13-Dec-2021 18:09

## 2023-11-28 NOTE — ED PROVIDER NOTE - CPE EDP RESP NORM
With progressive decline and requires max to complete assist with all ADLs  Transfers with [facility] lift  Continue 24/7 care and support at long-term care facility for ADLs; IADLs  Continue delirium precautions  Family is very supportive  Continue RNP program to prevent deconditioning normal...

## 2023-12-11 NOTE — ED STATDOCS - ATTENDING APP SHARED VISIT CONTRIBUTION OF CARE
I, Ruddy Linn MD,  performed the initial face to face bedside interview with this patient regarding history of present illness, review of symptoms and relevant past medical, social and family history.  I completed an independent physical examination.  I was the initial provider who evaluated this patient.   I personally saw the patient and performed a substantive portion of the visit including all aspects of the medical decision making.  I have signed out the follow up of any pending tests (i.e. labs, radiological studies) to the [facility].  I have communicated the patient’s plan of care and disposition with the [facility].  The history, relevant review of systems, past medical and surgical history, medical decision making, and physical examination was documented by the scribe in my presence and I attest to the accuracy of the documentation.
<-- Click to add NO pertinent Family History

## 2024-04-04 ENCOUNTER — APPOINTMENT (OUTPATIENT)
Dept: PHARMACY | Facility: CLINIC | Age: 64
End: 2024-04-04
Payer: SELF-PAY

## 2024-04-04 PROCEDURE — V5014B: CUSTOM | Mod: RT

## 2024-04-17 ENCOUNTER — OUTPATIENT (OUTPATIENT)
Dept: OUTPATIENT SERVICES | Facility: HOSPITAL | Age: 64
LOS: 1 days | Discharge: HOME | End: 2024-04-17

## 2024-04-17 ENCOUNTER — APPOINTMENT (OUTPATIENT)
Dept: SPEECH THERAPY | Facility: CLINIC | Age: 64
End: 2024-04-17

## 2024-04-17 DIAGNOSIS — Z98.890 OTHER SPECIFIED POSTPROCEDURAL STATES: Chronic | ICD-10-CM

## 2024-04-17 NOTE — REASON FOR VISIT
[Follow-Up] : follow-up for [Audiology Evaluation] : audiology evaluation [Friend] : friend [[employer] ] : provided by [employer]   [Time Spent: ____ minutes] : Total time spent using  services: [unfilled] minutes. The patient's primary language is not English thus required  services. [Interpreters_IDNumber] : [id number] [FreeTextEntry3] : Patient's friend also served as an .  [TWNoteComboBox1] : Bulgarian

## 2024-04-17 NOTE — PROCEDURE
[226 Hz] : 226 Hz [Normal Eardrum Mobility] : consistent with normal eardrum mobility [Type A Tympanogram] : Type A Normal [] : Audiogram: [] : Complete Audiological Evaluation [Good] : good [Insert Ear Phones] : insert ear phones

## 2024-04-17 NOTE — HISTORY OF PRESENT ILLNESS
[FreeTextEntry1] : 63-year-old M seen today for audiological evaluation. History of bilateral profound sensorineural hearing loss. Patient utilizes a Resound BTE hearing aid in the right ear and a Cochlear Nucleus cochlear implant in the left ear.  [FreeTextEntry8] : Patient reports that he has not been hearing as well with his right hearing aid. No other changes in otologic history noted.

## 2024-04-22 DIAGNOSIS — H90.3 SENSORINEURAL HEARING LOSS, BILATERAL: ICD-10-CM

## 2024-05-02 ENCOUNTER — OUTPATIENT (OUTPATIENT)
Dept: OUTPATIENT SERVICES | Facility: HOSPITAL | Age: 64
LOS: 1 days | Discharge: HOME | End: 2024-05-02

## 2024-05-02 ENCOUNTER — APPOINTMENT (OUTPATIENT)
Dept: SPEECH THERAPY | Facility: CLINIC | Age: 64
End: 2024-05-02

## 2024-05-02 ENCOUNTER — APPOINTMENT (OUTPATIENT)
Dept: PHARMACY | Facility: CLINIC | Age: 64
End: 2024-05-02
Payer: SELF-PAY

## 2024-05-02 DIAGNOSIS — Z98.890 OTHER SPECIFIED POSTPROCEDURAL STATES: Chronic | ICD-10-CM

## 2024-05-02 PROCEDURE — V5299A: CUSTOM

## 2024-05-02 NOTE — REASON FOR VISIT
[Follow-Up] : follow-up for [Cochlear Implant] : cochlear implant [[employer] ] : provided by [employer]   [Time Spent: ____ minutes] : Total time spent using  services: [unfilled] minutes. The patient's primary language is not English thus required  services. [Interpreters_IDNumber] : [id number] [Interpreters_FullName] : Janel [TWNoteComboBox1] : Cymro
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
## 2024-05-02 NOTE — PLAN
[FreeTextEntry2] : 1. F/U in Dispensary 2. Consider F/U with Dr Foster and right ear Cochlear Implant.

## 2024-05-02 NOTE — PLAN
[FreeTextEntry2] : 1. Hearing aid check as needed 2. Consider CI evaluation for right ear  3. Otologic follow up

## 2024-05-02 NOTE — HISTORY OF PRESENT ILLNESS
[FreeTextEntry1] : Left ear internal 622 cochlear implant with external black N7 processor (in warranty until December 2024) with black cable coil and 3I magnet.  [FreeTextEntry8] : Patient reports he cannot hear from his left ear processor and continues to complain that his hearing right ear hearing aids are not working. He is dependent on the right ear hearing aid. At last visit he had an audio that revealed his speech discrim in the right ear dropped and he cannot repeat words in Bahamian.

## 2024-05-02 NOTE — HISTORY OF PRESENT ILLNESS
[FreeTextEntry1] :   62 year old male with known profound SNHL AU, owns two Resound BTE HAs for the right ear. Uses Cochlear Nucleus CI AS. Hearing aids OOW  [FreeTextEntry8] : Patient reports hearing aid not working as well as previously. He notes his backup hearing aid is not working at all. Patient seen for HAC with NC as one time courtesy as per LW.

## 2024-05-02 NOTE — ASSESSMENT
[FreeTextEntry1] : Retubed patient's backup hearing aid which he stated was not working. Tubing very hard and clogged with cerumen. Hearing aid returned to good working condition. Cut tubing to appropriate length. Reprogrammed both of patient's OOW Resound BTE hearing aids to updated audio. Patient uncomfortable with programming at 100% and felt more comfortable with 90% adaptation. Patient walked in waiting room and was also comfortable with amplification. Reviewed volume control of hearing aids with patient. Patient indicated that he understood all. Extensively counseled regarding realistic expectations with hearing aid, given the severity of his hearing loss. Additionally counseled patient about the importance of scheduling an appointment and not walking in for appointments. Patient indicated that he understood all.

## 2024-05-02 NOTE — REASON FOR VISIT
[Follow-Up] : follow-up for [Hearing Aid] : Hearing Aid [Family Member] : family member [[employer] ] : provided by [employer]   [Time Spent: ____ minutes] : Total time spent using  services: [unfilled] minutes. The patient's primary language is not English thus required  services. [Interpreters_IDNumber] : [id number] [FreeTextEntry3] :  disconnected 2x [TWNoteComboBox1] : Latvian

## 2024-05-16 DIAGNOSIS — H90.3 SENSORINEURAL HEARING LOSS, BILATERAL: ICD-10-CM

## 2024-05-22 ENCOUNTER — APPOINTMENT (OUTPATIENT)
Dept: UROLOGY | Facility: CLINIC | Age: 64
End: 2024-05-22

## 2024-06-19 ENCOUNTER — APPOINTMENT (OUTPATIENT)
Dept: UROLOGY | Facility: CLINIC | Age: 64
End: 2024-06-19
Payer: COMMERCIAL

## 2024-06-19 VITALS
HEART RATE: 100 BPM | OXYGEN SATURATION: 99 % | SYSTOLIC BLOOD PRESSURE: 134 MMHG | DIASTOLIC BLOOD PRESSURE: 96 MMHG | WEIGHT: 204 LBS | BODY MASS INDEX: 32.78 KG/M2 | HEIGHT: 66 IN | RESPIRATION RATE: 16 BRPM

## 2024-06-19 DIAGNOSIS — N40.1 BENIGN PROSTATIC HYPERPLASIA WITH LOWER URINARY TRACT SYMPMS: ICD-10-CM

## 2024-06-19 DIAGNOSIS — N13.8 BENIGN PROSTATIC HYPERPLASIA WITH LOWER URINARY TRACT SYMPMS: ICD-10-CM

## 2024-06-19 PROCEDURE — 76872 US TRANSRECTAL: CPT

## 2024-06-19 PROCEDURE — 99212 OFFICE O/P EST SF 10 MIN: CPT | Mod: 25

## 2024-06-19 PROCEDURE — 76857 US EXAM PELVIC LIMITED: CPT

## 2024-06-19 NOTE — HISTORY OF PRESENT ILLNESS
[FreeTextEntry1] : 63M CC pt states dribbling with urination. Pt presents for evaluation. He has not had his labs done in some time and these were done prior to the examination today. He had a prostate ultrasound revealing a moderate sized gland, but he appears to empty his bladder well.

## 2024-06-19 NOTE — ASSESSMENT
[FreeTextEntry1] : Pt apparently doing reasonably well with bladder emptying.  His labs are sent and he will follow up in 1 year or as needed.

## 2024-06-19 NOTE — END OF VISIT
[FreeTextEntry3] :  All medical record entries made by Timur Smith (Scribe) were at my, Dr. Papi Chamberlain's, direction and personally dictated by me on 06/19/2024. I have reviewed the chart and agree that the record accurately reflects my personal performance of the history, physical exam, assessment and plan. I have also personally directed, reviewed, and agreed with the chart.

## 2024-06-21 LAB
APPEARANCE: CLEAR
BACTERIA UR CULT: NORMAL
BACTERIA: NEGATIVE /HPF
BILIRUBIN URINE: NEGATIVE
BLOOD URINE: NEGATIVE
CAST: 0 /LPF
COLOR: YELLOW
EPITHELIAL CELLS: 0 /HPF
GLUCOSE QUALITATIVE U: NEGATIVE MG/DL
KETONES URINE: NEGATIVE MG/DL
LEUKOCYTE ESTERASE URINE: NEGATIVE
MICROSCOPIC-UA: NORMAL
NITRITE URINE: NEGATIVE
PH URINE: 5.5
PROTEIN URINE: NEGATIVE MG/DL
PSA SERPL-MCNC: 0.87 NG/ML
RED BLOOD CELLS URINE: 1 /HPF
SPECIFIC GRAVITY URINE: 1.02
URINE CYTOLOGY: NORMAL
UROBILINOGEN URINE: 0.2 MG/DL
WHITE BLOOD CELLS URINE: 1 /HPF

## 2024-06-25 ENCOUNTER — NON-APPOINTMENT (OUTPATIENT)
Age: 64
End: 2024-06-25

## 2024-06-25 DIAGNOSIS — R89.6 ABNORMAL CYTOLOGICAL FINDINGS IN SPECIMENS FROM OTHER ORGANS, SYSTEMS AND TISSUES: ICD-10-CM

## 2024-07-12 ENCOUNTER — NON-APPOINTMENT (OUTPATIENT)
Age: 64
End: 2024-07-12

## 2024-07-19 ENCOUNTER — NON-APPOINTMENT (OUTPATIENT)
Age: 64
End: 2024-07-19

## 2024-07-19 DIAGNOSIS — R89.6 ABNORMAL CYTOLOGICAL FINDINGS IN SPECIMENS FROM OTHER ORGANS, SYSTEMS AND TISSUES: ICD-10-CM

## 2024-08-06 PROBLEM — Z12.11 SCREEN FOR COLON CANCER: Status: ACTIVE | Noted: 2024-08-06

## 2024-08-09 ENCOUNTER — APPOINTMENT (OUTPATIENT)
Dept: NEUROLOGY | Facility: CLINIC | Age: 64
End: 2024-08-09

## 2024-08-09 PROBLEM — R51.9 HEADACHE DISORDER: Status: ACTIVE | Noted: 2024-08-09

## 2024-08-09 PROCEDURE — 99203 OFFICE O/P NEW LOW 30 MIN: CPT
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
## 2024-08-09 NOTE — PHYSICAL EXAM
[General Appearance - Alert] : alert [General Appearance - In No Acute Distress] : in no acute distress [Person] : oriented to person [Place] : oriented to place [Writing A Sentence] : no difficulty writing a sentence [Fluency] : fluency intact [Comprehension] : comprehension intact [Reading] : reading intact [Vocabulary] : adequate range of vocabulary [Cranial Nerves Oculomotor (III)] : extraocular motion intact [Cranial Nerves Trigeminal (V)] : facial sensation intact symmetrically [Cranial Nerves Facial (VII)] : face symmetrical [Cranial Nerves Glossopharyngeal (IX)] : tongue and palate midline [Cranial Nerves Accessory (XI - Cranial And Spinal)] : head turning and shoulder shrug symmetric [Cranial Nerves Hypoglossal (XII)] : there was no tongue deviation with protrusion [Motor Tone] : muscle tone was normal in all four extremities [Motor Strength] : muscle strength was normal in all four extremities [Involuntary Movements] : no involuntary movements were seen [Motor Handedness Right-Handed] : the patient is right hand dominant [Sensation Tactile Decrease] : light touch was intact [Abnormal Walk] : normal gait [1+] : Brachioradialis left 1+ [0] : Ankle jerk left 0 [Finger Rub Not Kleberg] : finger rub was not heard [Paresis Pronator Drift Right-Sided] : no pronator drift on the right [Paresis Pronator Drift Left-Sided] : no pronator drift on the left [Limited Balance] : balance was intact [Past-pointing] : there was no past-pointing [Tremor] : no tremor present [Dysdiadochokinesia Bilaterally] : not present [Coordination - Dysmetria Impaired Finger-to-Nose Bilateral] : not present [FreeTextEntry5] : bilateral hearing aides, right thigh prosthesis  [FreeTextEntry1] : bilateral cochlear implants [Neck Appearance] : the appearance of the neck was normal [] : the neck was supple [No Spinal Tenderness] : no spinal tenderness [Arterial Pulses Carotid] : carotid pulses were normal with no bruits
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
## 2024-08-09 NOTE — REVIEW OF SYSTEMS
[As Noted in HPI] : as noted in HPI [Memory Lapses or Loss] : memory loss [Dizziness] : dizziness [Tension Headache] : tension-type headaches [Loss Of Hearing] : hearing loss [Negative] : Heme/Lymph

## 2024-09-19 ENCOUNTER — RX RENEWAL (OUTPATIENT)
Age: 64
End: 2024-09-19

## 2024-12-02 ENCOUNTER — APPOINTMENT (OUTPATIENT)
Dept: NEUROLOGY | Facility: CLINIC | Age: 64
End: 2024-12-02

## 2025-02-05 ENCOUNTER — NON-APPOINTMENT (OUTPATIENT)
Age: 65
End: 2025-02-05

## 2025-02-05 ENCOUNTER — APPOINTMENT (OUTPATIENT)
Dept: NEUROLOGY | Facility: CLINIC | Age: 65
End: 2025-02-05
Payer: COMMERCIAL

## 2025-02-05 VITALS
SYSTOLIC BLOOD PRESSURE: 149 MMHG | BODY MASS INDEX: 33.43 KG/M2 | HEART RATE: 116 BPM | HEIGHT: 66 IN | DIASTOLIC BLOOD PRESSURE: 99 MMHG | WEIGHT: 208 LBS

## 2025-02-05 PROCEDURE — 99213 OFFICE O/P EST LOW 20 MIN: CPT

## 2025-02-13 ENCOUNTER — APPOINTMENT (OUTPATIENT)
Dept: PHARMACY | Facility: CLINIC | Age: 65
End: 2025-02-13
Payer: COMMERCIAL

## 2025-02-13 PROCEDURE — V5266B: CUSTOM

## 2025-02-13 PROCEDURE — V5014B: CUSTOM

## 2025-03-12 ENCOUNTER — APPOINTMENT (OUTPATIENT)
Dept: PHARMACY | Facility: CLINIC | Age: 65
End: 2025-03-12

## 2025-04-28 ENCOUNTER — RX RENEWAL (OUTPATIENT)
Age: 65
End: 2025-04-28

## 2025-06-05 ENCOUNTER — APPOINTMENT (OUTPATIENT)
Dept: NEUROLOGY | Facility: CLINIC | Age: 65
End: 2025-06-05
Payer: COMMERCIAL

## 2025-06-05 VITALS
DIASTOLIC BLOOD PRESSURE: 106 MMHG | BODY MASS INDEX: 33.11 KG/M2 | WEIGHT: 206 LBS | HEIGHT: 66 IN | RESPIRATION RATE: 15 BRPM | SYSTOLIC BLOOD PRESSURE: 156 MMHG | HEART RATE: 101 BPM

## 2025-06-05 PROCEDURE — 99213 OFFICE O/P EST LOW 20 MIN: CPT
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
## 2025-07-21 ENCOUNTER — RX RENEWAL (OUTPATIENT)
Age: 65
End: 2025-07-21